# Patient Record
Sex: FEMALE | Race: WHITE | NOT HISPANIC OR LATINO | Employment: FULL TIME | URBAN - NONMETROPOLITAN AREA
[De-identification: names, ages, dates, MRNs, and addresses within clinical notes are randomized per-mention and may not be internally consistent; named-entity substitution may affect disease eponyms.]

---

## 2019-01-02 ENCOUNTER — APPOINTMENT (OUTPATIENT)
Dept: CT IMAGING | Facility: HOSPITAL | Age: 54
End: 2019-01-02

## 2019-01-02 ENCOUNTER — HOSPITAL ENCOUNTER (EMERGENCY)
Facility: HOSPITAL | Age: 54
Discharge: HOME | End: 2019-01-02
Attending: EMERGENCY MEDICINE | Admitting: EMERGENCY MEDICINE

## 2019-01-02 ENCOUNTER — APPOINTMENT (OUTPATIENT)
Dept: GENERAL RADIOLOGY | Facility: HOSPITAL | Age: 54
End: 2019-01-02

## 2019-01-02 VITALS
OXYGEN SATURATION: 100 % | RESPIRATION RATE: 18 BRPM | HEIGHT: 60 IN | TEMPERATURE: 98.9 F | BODY MASS INDEX: 29.27 KG/M2 | WEIGHT: 149.1 LBS | DIASTOLIC BLOOD PRESSURE: 84 MMHG | HEART RATE: 62 BPM | SYSTOLIC BLOOD PRESSURE: 144 MMHG

## 2019-01-02 DIAGNOSIS — M51.26 LUMBAR HERNIATED DISC: Primary | ICD-10-CM

## 2019-01-02 LAB
B-HCG UR QL: NEGATIVE
BACTERIA UR QL AUTO: ABNORMAL /HPF
BILIRUB UR QL STRIP: NEGATIVE
CLARITY UR: CLEAR
COLOR UR: YELLOW
GLUCOSE UR STRIP-MCNC: NEGATIVE MG/DL
HGB UR QL STRIP.AUTO: ABNORMAL
HYALINE CASTS UR QL AUTO: ABNORMAL /LPF
KETONES UR QL STRIP: NEGATIVE
LEUKOCYTE ESTERASE UR QL STRIP.AUTO: NEGATIVE
NITRITE UR QL STRIP: NEGATIVE
PH UR STRIP.AUTO: 5.5 [PH] (ref 5–9)
PROT UR QL STRIP: NEGATIVE
RBC # UR: ABNORMAL /HPF
REF LAB TEST METHOD: ABNORMAL
SP GR UR STRIP: 1.01 (ref 1–1.03)
SQUAMOUS #/AREA URNS HPF: ABNORMAL /HPF
UROBILINOGEN UR QL STRIP: ABNORMAL
WBC UR QL AUTO: ABNORMAL /HPF

## 2019-01-02 PROCEDURE — 72131 CT LUMBAR SPINE W/O DYE: CPT

## 2019-01-02 PROCEDURE — 25010000002 KETOROLAC TROMETHAMINE PER 15 MG: Performed by: PHYSICIAN ASSISTANT

## 2019-01-02 PROCEDURE — 81001 URINALYSIS AUTO W/SCOPE: CPT | Performed by: PHYSICIAN ASSISTANT

## 2019-01-02 PROCEDURE — 72220 X-RAY EXAM SACRUM TAILBONE: CPT

## 2019-01-02 PROCEDURE — 96372 THER/PROPH/DIAG INJ SC/IM: CPT

## 2019-01-02 PROCEDURE — 81025 URINE PREGNANCY TEST: CPT | Performed by: PHYSICIAN ASSISTANT

## 2019-01-02 PROCEDURE — 99283 EMERGENCY DEPT VISIT LOW MDM: CPT

## 2019-01-02 RX ORDER — PREDNISONE 20 MG/1
20 TABLET ORAL 2 TIMES DAILY
Qty: 14 TABLET | Refills: 0 | Status: SHIPPED | OUTPATIENT
Start: 2019-01-02 | End: 2019-01-09

## 2019-01-02 RX ORDER — KETOROLAC TROMETHAMINE 30 MG/ML
30 INJECTION, SOLUTION INTRAMUSCULAR; INTRAVENOUS EVERY 6 HOURS PRN
Status: DISCONTINUED | OUTPATIENT
Start: 2019-01-02 | End: 2019-01-02 | Stop reason: HOSPADM

## 2019-01-02 RX ADMIN — KETOROLAC TROMETHAMINE 30 MG: 30 INJECTION, SOLUTION INTRAMUSCULAR; INTRAVENOUS at 11:26

## 2019-01-03 DIAGNOSIS — M54.5 LOW BACK PAIN, UNSPECIFIED BACK PAIN LATERALITY, UNSPECIFIED CHRONICITY, WITH SCIATICA PRESENCE UNSPECIFIED: Primary | ICD-10-CM

## 2019-01-04 ENCOUNTER — OFFICE VISIT (OUTPATIENT)
Dept: ORTHOPEDIC SURGERY | Facility: CLINIC | Age: 54
End: 2019-01-04

## 2019-01-04 VITALS — BODY MASS INDEX: 29.04 KG/M2 | HEIGHT: 60 IN | WEIGHT: 147.9 LBS

## 2019-01-04 DIAGNOSIS — M51.36 DEGENERATIVE DISC DISEASE, LUMBAR: ICD-10-CM

## 2019-01-04 DIAGNOSIS — M54.42 ACUTE LEFT-SIDED LOW BACK PAIN WITH LEFT-SIDED SCIATICA: Primary | ICD-10-CM

## 2019-01-04 DIAGNOSIS — M54.10 RADICULAR PAIN OF LEFT LOWER EXTREMITY: ICD-10-CM

## 2019-01-04 DIAGNOSIS — M46.1 SI (SACROILIAC) JOINT INFLAMMATION (HCC): ICD-10-CM

## 2019-01-04 PROCEDURE — 99204 OFFICE O/P NEW MOD 45 MIN: CPT | Performed by: NURSE PRACTITIONER

## 2019-01-04 PROCEDURE — 96372 THER/PROPH/DIAG INJ SC/IM: CPT | Performed by: NURSE PRACTITIONER

## 2019-01-04 RX ORDER — IBUPROFEN 200 MG
200 TABLET ORAL EVERY 6 HOURS PRN
COMMUNITY
End: 2019-01-08 | Stop reason: DRUGHIGH

## 2019-01-04 RX ORDER — TRIAMCINOLONE ACETONIDE 40 MG/ML
80 INJECTION, SUSPENSION INTRA-ARTICULAR; INTRAMUSCULAR ONCE
Status: COMPLETED | OUTPATIENT
Start: 2019-01-04 | End: 2019-01-04

## 2019-01-04 RX ORDER — KETOROLAC TROMETHAMINE 30 MG/ML
60 INJECTION, SOLUTION INTRAMUSCULAR; INTRAVENOUS ONCE
Status: COMPLETED | OUTPATIENT
Start: 2019-01-04 | End: 2019-01-04

## 2019-01-04 RX ADMIN — KETOROLAC TROMETHAMINE 60 MG: 30 INJECTION, SOLUTION INTRAMUSCULAR; INTRAVENOUS at 14:41

## 2019-01-04 RX ADMIN — TRIAMCINOLONE ACETONIDE 80 MG: 40 INJECTION, SUSPENSION INTRA-ARTICULAR; INTRAMUSCULAR at 14:39

## 2019-01-04 NOTE — PROGRESS NOTES
Lin Pacheco is a 53 y.o. female   Primary provider:  Provider, No Known       Chief Complaint   Patient presents with   • Lumbar Spine - Pain   • Establish Care       HISTORY OF PRESENT ILLNESS:     53-year-old female patient presents to office for evaluation of acute left-sided low back pain with pain that radiates down her left leg.  Patient had abrupt onset of pain about two weeks ago following a lifting injury when she picked up her dog.  Patient was evaluated in the ED on 1/2/2019 with a CT scan of her lumbar spine.  Patient was given Toradol in the ED and prescribed prednisone.  Patient continues to complain of severe left-sided low back pain and left leg pain.  Pain is described as constant and severe.  Pain is described as stabbing and burning in nature.  No numbness or tingling.  No loss of bowel or bladder control.  Pain is worse with lying down and sitting.  Pain is also present with standing and walking, but slightly improved.  Pain is worse with movement/motion of her back.  Pain improves mildly with oral steroids, rest and Ibuprofen.      Pain   This is a new problem. The current episode started 1 to 4 weeks ago (About 2 weeks ago). The problem occurs constantly. The problem has been unchanged. Associated symptoms include arthralgias and myalgias. Associated symptoms comments: Stabbing, burning. The symptoms are aggravated by bending, standing, twisting, walking and exertion (sitting, lying down). She has tried acetaminophen, NSAIDs, rest and lying down (prednisone, Toradol) for the symptoms. The treatment provided mild relief.        CONCURRENT MEDICAL HISTORY:    History reviewed. No pertinent past medical history.    No Known Allergies      Current Outpatient Medications:   •  predniSONE (DELTASONE) 20 MG tablet, Take 1 tablet by mouth 2 (Two) Times a Day for 7 days., Disp: 14 tablet, Rfl: 0  •  ibuprofen (ADVIL,MOTRIN) 800 MG tablet, Take 1 tablet by mouth Every 8 (Eight) Hours As Needed for  "Moderate Pain  for up to 30 days., Disp: 90 tablet, Rfl: 2    History reviewed. No pertinent surgical history.    Family History   Problem Relation Age of Onset   • Heart disease Other    • Hypertension Other    • Cancer Other    • Diabetes Other         Social History     Socioeconomic History   • Marital status:      Spouse name: Not on file   • Number of children: Not on file   • Years of education: Not on file   • Highest education level: Not on file   Social Needs   • Financial resource strain: Not on file   • Food insecurity - worry: Not on file   • Food insecurity - inability: Not on file   • Transportation needs - medical: Not on file   • Transportation needs - non-medical: Not on file   Occupational History   • Not on file   Tobacco Use   • Smoking status: Current Every Day Smoker     Packs/day: 0.50     Years: 11.00     Pack years: 5.50     Types: Cigarettes   • Smokeless tobacco: Never Used   Substance and Sexual Activity   • Alcohol use: No     Frequency: Never   • Drug use: Not on file   • Sexual activity: Not on file   Other Topics Concern   • Not on file   Social History Narrative   • Not on file        Review of Systems   Constitutional: Positive for activity change.   HENT: Negative.    Eyes: Negative.    Respiratory: Negative.    Cardiovascular: Negative.    Gastrointestinal: Negative.    Endocrine: Negative.    Genitourinary: Negative.    Musculoskeletal: Positive for arthralgias, back pain, gait problem and myalgias.   Skin: Negative.    Allergic/Immunologic: Negative.    Hematological: Negative.    Psychiatric/Behavioral: Positive for sleep disturbance.       PHYSICAL EXAMINATION:       Ht 152.4 cm (60\")   Wt 67.1 kg (147 lb 14.4 oz)   BMI 28.88 kg/m²     Physical Exam   Constitutional: She is oriented to person, place, and time. Vital signs are normal. She appears well-developed and well-nourished. She is active and cooperative. She does not appear ill. No distress.   HENT:   Head: " Normocephalic.   Pulmonary/Chest: Effort normal. No respiratory distress.   Abdominal: Soft. She exhibits no distension.   Musculoskeletal: She exhibits tenderness (Left SI joint). She exhibits no edema or deformity.   Neurological: She is alert and oriented to person, place, and time. No sensory deficit. Coordination normal. GCS eye subscore is 4. GCS verbal subscore is 5. GCS motor subscore is 6.   Skin: Skin is warm, dry and intact. Capillary refill takes less than 2 seconds. No erythema.   Psychiatric: She has a normal mood and affect. Her speech is normal and behavior is normal. Judgment and thought content normal. Cognition and memory are normal.   Vitals reviewed.      GAIT:     []  Normal  [x]  Antalgic    Assistive device: [x]  None  []  Walker     []  Crutches  []  Cane     []  Wheelchair  []  Stretcher    Back Exam     Tenderness   The patient is experiencing tenderness in the sacroiliac.    Range of Motion   Extension: abnormal   Flexion: abnormal   Rotation right: abnormal   Rotation left: abnormal     Muscle Strength   The patient has normal back strength.    Tests   Straight leg raise right: negative  Straight leg raise left: positive at 70 deg    Reflexes   Patellar: normal    Other   Sensation: normal  Gait: antalgic   Erythema: no back redness    Comments:  Pain and limitations with minimal range of motion.  Significant tenderness to palpation in the left SI joint.  Straight leg raise is positive on the left.  Patient is unable to fully sit upright due to pain.  No focal neurological deficits noted.            Xr Spine Lumbar 2 Or 3 View    Result Date: 1/4/2019  Narrative: AP and lateral views of the lumbar spine reveal no evidence of acute fracture or subluxation.  There is mild levoscoliosis noted of the lumbar spine.  There is anterior spurring noted at multiple levels throughout the lumbar spine, more pronounced at L2 and L3.  There is mildly decreased intervertebral disc spacing noted at  multiple levels.  Vertebral body heights are maintained.  No acute bony radiologic abnormalities are noted at this time.  No comparison images are available for review.01/04/19 at 4:46 PM by LUCIANA Devi     Xr Sacrum & Coccyx    Result Date: 1/2/2019  Narrative: PROCEDURE: Sacrum and coccyx TECHNIQUE: AP, lateral and pelvic inlet views of the sacrum and coccyx COMPARISON: None HISTORY: Left buttock pain radiating to the left groin FINDINGS: No radiographic evidence of acute fracture, subluxation or focal osseous lesion. The sacroiliac joints are intact. Rounded calcifications projecting over the pelvis and the appearance of phleboliths.     Impression: CONCLUSION: No radiographic evidence of acute fracture. Electronically signed by:  Martin Parrish MD  1/2/2019 12:44 PM CST Workstation: [id number]    Ct Lumbar Spine Without Contrast    Result Date: 1/2/2019  Narrative: CT lumbar spine without contrast. HISTORY: Back pain with left leg radiculopathy. COMPARISON: CT abdomen pelvis October 15, 2013.   TECHNIQUE: Noncontrast helical scanning with axial and coronal reformations. Soft tissue, lung, and bone windows reviewed. This exam was performed according to our departmental dose-optimization program, which includes automated exposure control, adjustment of the mA and/or kV according to patient size and/or use of iterative reconstruction technique.  CT FINDINGS: Small anterior osteophytes at L2-L3, L1-L2. No fracture or areas of bony destruction. Subtle areas of disc bulging at multiple levels most pronounced at L5-S1. No evidence of a focal disc protrusion. No central canal stenosis. No appreciable foraminal stenosis. Facet arthrosis most pronounced at L4-L5 and L5-S1.     Impression: CONCLUSION: Small anterior osteophytes. Facet arthrosis at L4-L5 and L5-S1. Subtle concentric disc bulging at multiple levels most pronounced at L5-S1. CT lumbar spine without contrast is otherwise unremarkable. No focal disc  protrusion. No central canal or neuroforaminal stenosis. Electronically signed by:  Justyn Valenzuela MD  1/2/2019 11:16 AM CST Workstation: [id number]    ASSESSMENT:    Diagnoses and all orders for this visit:    Acute left-sided low back pain with left-sided sciatica  -     triamcinolone acetonide (KENALOG-40) injection 80 mg; Inject 2 mL into the appropriate muscle as directed by prescriber 1 (One) Time.  -     ketorolac (TORADOL) injection 60 mg; Inject 2 mL into the appropriate muscle as directed by prescriber 1 (One) Time.    Radicular pain of left lower extremity    SI (sacroiliac) joint inflammation (CMS/HCC)    Degenerative disc disease, lumbar    Other orders  -     ibuprofen (ADVIL,MOTRIN) 800 MG tablet; Take 1 tablet by mouth Every 8 (Eight) Hours As Needed for Moderate Pain  for up to 30 days.    PLAN    X-rays of lumbar spine reviewed with no acute findings noted.  CT scan of lumbar spine done in ED on 1/2/2019 reviewed and results discussed with patient.  We discussed the degenerative findings noted on her CT including facet arthrosis and some subtle disc bulging at multiple levels.  Patient is primarily having pain and significant tenderness in the left SI joint. Her symptoms started abruptly when she lifted her dog. While MRI would be helpful for further evaluation of her degenerative disc disease, the patient currently does not have medical insurance MRI was not feasible.  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 She states that her insurance takes effect 3 months after she has started her job.  She reports that she should have medical insurance in the next month or so.  Recommend IM injections of Toradol and Kenalog today for management of pain/inflammation.  Recommend to continue prednisone as previously prescribed in the ED.  Recommend to continue ibuprofen consistently for improved pain control.  Ibuprofen 800 mg is prescribed today.  Patient declines any pain medication.  Recommend  rest and activity modification as tolerated based on her pain with avoidance of lifting, pulling and/or tugging.  He was provided with a work note today and will remain off work for 1 week.  This may need to be extended if she is not improving.  Follow-up in 2 weeks for recheck.  Patient may need an MRI of her lumbar spine once her medical insurance takes effect.    Return in about 2 weeks (around 1/18/2019) for Recheck.      This document has been electronically signed by LUCIANA Devi on January 8, 2019 8:43 AM      LUCIANA Devi

## 2019-01-08 PROBLEM — M51.36 DEGENERATIVE DISC DISEASE, LUMBAR: Status: ACTIVE | Noted: 2019-01-08

## 2019-01-08 PROBLEM — M54.42 ACUTE LEFT-SIDED LOW BACK PAIN WITH LEFT-SIDED SCIATICA: Status: ACTIVE | Noted: 2019-01-08

## 2019-01-08 PROBLEM — M46.1 SI (SACROILIAC) JOINT INFLAMMATION (HCC): Status: ACTIVE | Noted: 2019-01-08

## 2019-01-08 PROBLEM — M54.10 RADICULAR PAIN OF LEFT LOWER EXTREMITY: Status: ACTIVE | Noted: 2019-01-08

## 2019-01-08 RX ORDER — IBUPROFEN 800 MG/1
800 TABLET ORAL EVERY 8 HOURS PRN
Qty: 90 TABLET | Refills: 2 | Status: SHIPPED | OUTPATIENT
Start: 2019-01-08 | End: 2019-02-07

## 2020-02-01 ENCOUNTER — HOSPITAL ENCOUNTER (EMERGENCY)
Facility: HOSPITAL | Age: 55
Discharge: HOME | End: 2020-02-01
Attending: EMERGENCY MEDICINE | Admitting: EMERGENCY MEDICINE

## 2020-02-01 ENCOUNTER — APPOINTMENT (OUTPATIENT)
Dept: GENERAL RADIOLOGY | Facility: HOSPITAL | Age: 55
End: 2020-02-01

## 2020-02-01 VITALS
DIASTOLIC BLOOD PRESSURE: 74 MMHG | HEIGHT: 60 IN | TEMPERATURE: 98.1 F | RESPIRATION RATE: 20 BRPM | SYSTOLIC BLOOD PRESSURE: 144 MMHG | OXYGEN SATURATION: 100 % | HEART RATE: 82 BPM | BODY MASS INDEX: 30.43 KG/M2 | WEIGHT: 155 LBS

## 2020-02-01 DIAGNOSIS — S46.912A STRAIN OF LEFT SHOULDER, INITIAL ENCOUNTER: Primary | ICD-10-CM

## 2020-02-01 PROCEDURE — 25010000002 KETOROLAC TROMETHAMINE PER 15 MG: Performed by: EMERGENCY MEDICINE

## 2020-02-01 PROCEDURE — 99283 EMERGENCY DEPT VISIT LOW MDM: CPT

## 2020-02-01 PROCEDURE — 96372 THER/PROPH/DIAG INJ SC/IM: CPT

## 2020-02-01 PROCEDURE — 73030 X-RAY EXAM OF SHOULDER: CPT

## 2020-02-01 RX ORDER — MELOXICAM 15 MG/1
15 TABLET ORAL DAILY PRN
Qty: 15 TABLET | Refills: 0 | Status: SHIPPED | OUTPATIENT
Start: 2020-02-01 | End: 2020-03-10

## 2020-02-01 RX ORDER — KETOROLAC TROMETHAMINE 30 MG/ML
60 INJECTION, SOLUTION INTRAMUSCULAR; INTRAVENOUS ONCE
Status: COMPLETED | OUTPATIENT
Start: 2020-02-01 | End: 2020-02-01

## 2020-02-01 RX ORDER — METHOCARBAMOL 500 MG/1
1000 TABLET, FILM COATED ORAL 4 TIMES DAILY PRN
Qty: 20 TABLET | Refills: 0 | Status: SHIPPED | OUTPATIENT
Start: 2020-02-01 | End: 2020-03-10

## 2020-02-01 RX ADMIN — KETOROLAC TROMETHAMINE 60 MG: 60 INJECTION, SOLUTION INTRAMUSCULAR at 21:59

## 2020-02-02 NOTE — ED NOTES
Phoned xray related to when they will come, be here in a minute      Cyndi Lopez, RN  02/01/20 [id number]
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
## 2020-03-10 ENCOUNTER — LAB (OUTPATIENT)
Dept: LAB | Facility: HOSPITAL | Age: 55
End: 2020-03-10

## 2020-03-10 ENCOUNTER — TELEPHONE (OUTPATIENT)
Dept: FAMILY MEDICINE CLINIC | Facility: CLINIC | Age: 55
End: 2020-03-10

## 2020-03-10 ENCOUNTER — OFFICE VISIT (OUTPATIENT)
Dept: FAMILY MEDICINE CLINIC | Facility: CLINIC | Age: 55
End: 2020-03-10

## 2020-03-10 VITALS
BODY MASS INDEX: 29.45 KG/M2 | SYSTOLIC BLOOD PRESSURE: 108 MMHG | DIASTOLIC BLOOD PRESSURE: 70 MMHG | WEIGHT: 150 LBS | OXYGEN SATURATION: 98 % | HEART RATE: 87 BPM | HEIGHT: 60 IN

## 2020-03-10 DIAGNOSIS — Z13.220 ENCOUNTER FOR LIPID SCREENING FOR CARDIOVASCULAR DISEASE: ICD-10-CM

## 2020-03-10 DIAGNOSIS — Z76.89 ENCOUNTER TO ESTABLISH CARE: ICD-10-CM

## 2020-03-10 DIAGNOSIS — Z13.6 ENCOUNTER FOR LIPID SCREENING FOR CARDIOVASCULAR DISEASE: ICD-10-CM

## 2020-03-10 DIAGNOSIS — K27.9 PEPTIC ULCER: ICD-10-CM

## 2020-03-10 DIAGNOSIS — Z11.59 NEED FOR HEPATITIS C SCREENING TEST: ICD-10-CM

## 2020-03-10 DIAGNOSIS — R10.13 EPIGASTRIC PAIN: Primary | ICD-10-CM

## 2020-03-10 LAB
ALBUMIN SERPL-MCNC: 4.4 G/DL (ref 3.5–5.2)
ALBUMIN/GLOB SERPL: 1.3 G/DL
ALP SERPL-CCNC: 129 U/L (ref 39–117)
ALT SERPL W P-5'-P-CCNC: 16 U/L (ref 1–33)
ANION GAP SERPL CALCULATED.3IONS-SCNC: 14 MMOL/L (ref 5–15)
AST SERPL-CCNC: 22 U/L (ref 1–32)
BASOPHILS # BLD AUTO: 0.04 10*3/MM3 (ref 0–0.2)
BASOPHILS NFR BLD AUTO: 0.4 % (ref 0–1.5)
BILIRUB SERPL-MCNC: 0.2 MG/DL (ref 0.2–1.2)
BUN BLD-MCNC: 8 MG/DL (ref 6–20)
BUN/CREAT SERPL: 9.4 (ref 7–25)
CALCIUM SPEC-SCNC: 9.9 MG/DL (ref 8.6–10.5)
CHLORIDE SERPL-SCNC: 104 MMOL/L (ref 98–107)
CHOLEST SERPL-MCNC: 193 MG/DL (ref 0–200)
CO2 SERPL-SCNC: 25 MMOL/L (ref 22–29)
CREAT BLD-MCNC: 0.85 MG/DL (ref 0.57–1)
DEPRECATED RDW RBC AUTO: 42.5 FL (ref 37–54)
EOSINOPHIL # BLD AUTO: 0.22 10*3/MM3 (ref 0–0.4)
EOSINOPHIL NFR BLD AUTO: 2.2 % (ref 0.3–6.2)
ERYTHROCYTE [DISTWIDTH] IN BLOOD BY AUTOMATED COUNT: 13.4 % (ref 12.3–15.4)
GFR SERPL CREATININE-BSD FRML MDRD: 70 ML/MIN/1.73
GLOBULIN UR ELPH-MCNC: 3.3 GM/DL
GLUCOSE BLD-MCNC: 101 MG/DL (ref 65–99)
HCT VFR BLD AUTO: 39.9 % (ref 34–46.6)
HCV AB SER DONR QL: NORMAL
HDLC SERPL-MCNC: 43 MG/DL (ref 40–60)
HGB BLD-MCNC: 13 G/DL (ref 12–15.9)
IMM GRANULOCYTES # BLD AUTO: 0.02 10*3/MM3 (ref 0–0.05)
IMM GRANULOCYTES NFR BLD AUTO: 0.2 % (ref 0–0.5)
LDLC SERPL CALC-MCNC: 103 MG/DL (ref 0–100)
LDLC/HDLC SERPL: 2.39 {RATIO}
LIPASE SERPL-CCNC: 21 U/L (ref 13–60)
LYMPHOCYTES # BLD AUTO: 3.64 10*3/MM3 (ref 0.7–3.1)
LYMPHOCYTES NFR BLD AUTO: 36.5 % (ref 19.6–45.3)
MCH RBC QN AUTO: 28.3 PG (ref 26.6–33)
MCHC RBC AUTO-ENTMCNC: 32.6 G/DL (ref 31.5–35.7)
MCV RBC AUTO: 86.9 FL (ref 79–97)
MONOCYTES # BLD AUTO: 0.45 10*3/MM3 (ref 0.1–0.9)
MONOCYTES NFR BLD AUTO: 4.5 % (ref 5–12)
NEUTROPHILS # BLD AUTO: 5.6 10*3/MM3 (ref 1.7–7)
NEUTROPHILS NFR BLD AUTO: 56.2 % (ref 42.7–76)
NRBC BLD AUTO-RTO: 0 /100 WBC (ref 0–0.2)
PLATELET # BLD AUTO: 289 10*3/MM3 (ref 140–450)
PMV BLD AUTO: 9.8 FL (ref 6–12)
POTASSIUM BLD-SCNC: 4.3 MMOL/L (ref 3.5–5.2)
PROT SERPL-MCNC: 7.7 G/DL (ref 6–8.5)
RBC # BLD AUTO: 4.59 10*6/MM3 (ref 3.77–5.28)
SODIUM BLD-SCNC: 143 MMOL/L (ref 136–145)
TRIGL SERPL-MCNC: 236 MG/DL (ref 0–150)
VLDLC SERPL-MCNC: 47.2 MG/DL
WBC NRBC COR # BLD: 9.97 10*3/MM3 (ref 3.4–10.8)

## 2020-03-10 PROCEDURE — 86803 HEPATITIS C AB TEST: CPT

## 2020-03-10 PROCEDURE — 99203 OFFICE O/P NEW LOW 30 MIN: CPT | Performed by: FAMILY MEDICINE

## 2020-03-10 PROCEDURE — 85025 COMPLETE CBC W/AUTO DIFF WBC: CPT

## 2020-03-10 PROCEDURE — 83690 ASSAY OF LIPASE: CPT

## 2020-03-10 PROCEDURE — 36415 COLL VENOUS BLD VENIPUNCTURE: CPT

## 2020-03-10 PROCEDURE — 80053 COMPREHEN METABOLIC PANEL: CPT

## 2020-03-10 PROCEDURE — 80061 LIPID PANEL: CPT

## 2020-03-10 NOTE — TELEPHONE ENCOUNTER
Per Dr. Self, Ms. Pacheco has been called with results and recommendations.  Continue her current medications and follow-up as planned or sooner if any problems.

## 2020-03-10 NOTE — TELEPHONE ENCOUNTER
Please call patient with results.  Lipid panel used to calculate patient's 10-year risk of ASCVD (Heart attack/stroke).  Risk is 4%, which is low.  Will discuss steps toward smoking cessation at next visit, as this is one of the best ways to decrease her risk.  No statin indicated at this time.  Otherwise blood work from this morning was normal.  No anemia.  Patient should continue Nexium.  Should be seen in the emergency room with any GI bleeding as discussed at earlier appointment.  Thanks, LIZ Self

## 2020-03-10 NOTE — PROGRESS NOTES
Subjective   Chief Complaint   Patient presents with   • Establish Care   • Abdominal Pain     blood in stool x1day     Lin Pacheco is a 54 y.o. year old presenting to establish care as new patient.      Additional Concerns:    Epigastric pain - Patient notes that she has epigastric pain that started about a week ago.  Pain when first started was constantly dull with intermittent sharpness.  She has bloating in her stomach.  Pain is worse when she is sitting, and relieved by lying down.  Pain is also significantly worse after eating meals.  Pain not increased with certain foods. Pain sometimes radiates to her back.  At its worst, pain is 7 out of 10.  At its best, pain is 0 out of 10.  She has never had a pain like this before.  Patient started taking Nexium on her own.  She is taking 40 mg daily.  With the Nexium, as long as she is not eating she does not have pain.  Patient reports noting dark black blood in her stool for 2 bowel movements last week, but none since this time.        Past Medical History:   Diagnosis Date   • Depression        Past Surgical History:   Procedure Laterality Date   •  SECTION     • CHOLECYSTECTOMY         Medications:  Current Outpatient Medications on File Prior to Visit   Medication Sig Dispense Refill   • esomeprazole (nexIUM) 20 MG capsule Take 20 mg by mouth 2 (Two) Times a Day.     • [DISCONTINUED] meloxicam (MOBIC) 15 MG tablet Take 1 tablet by mouth Daily As Needed for Mild Pain . 15 tablet 0   • [DISCONTINUED] methocarbamol (ROBAXIN) 500 MG tablet Take 2 tablets by mouth 4 (Four) Times a Day As Needed for Muscle Spasms. 20 tablet 0     No current facility-administered medications on file prior to visit.        No Known Allergies    Family History   Problem Relation Age of Onset   • Heart disease Other    • Hypertension Other    • Cancer Other    • Diabetes Mother        Social History     Socioeconomic History   • Marital status:      Spouse name: Not on  "file   • Number of children: Not on file   • Years of education: Not on file   • Highest education level: Not on file   Tobacco Use   • Smoking status: Current Every Day Smoker     Packs/day: 1.00     Years: 5.00     Pack years: 5.00     Types: Cigarettes   • Smokeless tobacco: Never Used   Substance and Sexual Activity   • Alcohol use: No     Frequency: Never     LMP: Post menopausal, menopause at age 51    Health Maintenance   Topic Date Due   • MAMMOGRAM  1965   • TDAP/TD VACCINES (1 - Tdap) 08/20/1976   • PNEUMOCOCCAL VACCINE (19-64 MEDIUM RISK) (1 of 1 - PPSV23) 08/20/1984   • ZOSTER VACCINE (1 of 2) 08/20/2015   • PAP SMEAR  01/03/2019   • COLONOSCOPY  01/03/2019   • INFLUENZA VACCINE  08/01/2019       Problem list was reviewed and updated as appropriate.      Review of Systems   Constitutional: Negative for appetite change and unexpected weight change.   HENT: Negative for voice change.    Eyes: Negative for visual disturbance.   Respiratory: Negative for chest tightness and shortness of breath.    Cardiovascular: Negative for chest pain and leg swelling.   Gastrointestinal: Positive for abdominal pain, blood in stool (dark) and nausea. Negative for constipation and diarrhea.   Endocrine: Positive for heat intolerance. Negative for cold intolerance.   Genitourinary: Negative for difficulty urinating.   Musculoskeletal: Negative for back pain and myalgias.   Skin: Negative for rash.   Neurological: Negative for numbness and headaches.   Psychiatric/Behavioral: Negative for dysphoric mood and sleep disturbance.         Objective     /70   Pulse 87   Ht 152.4 cm (60\")   Wt 68 kg (150 lb)   SpO2 98%   BMI 29.29 kg/m²   Physical Exam   Constitutional: She is oriented to person, place, and time. She appears well-developed and well-nourished. No distress.   HENT:   Head: Normocephalic and atraumatic.   Nose: Nose normal.   Mouth/Throat: Oropharynx is clear and moist.   Eyes: Pupils are equal, round, " and reactive to light. Conjunctivae and EOM are normal.   Neck: Normal range of motion. Neck supple. No thyromegaly present.   Cardiovascular: Normal rate, regular rhythm and normal heart sounds.   No murmur heard.  Pulmonary/Chest: Effort normal and breath sounds normal. No respiratory distress. She has no wheezes. She has no rales.   Abdominal: Soft. Bowel sounds are normal. She exhibits no distension. There is no hepatosplenomegaly. There is tenderness in the epigastric area. There is no rigidity, no rebound, no guarding, no CVA tenderness and negative Pacheco's sign.   Musculoskeletal: Normal range of motion. She exhibits no edema or tenderness.   Lymphadenopathy:     She has no cervical adenopathy.   Neurological: She is alert and oriented to person, place, and time.   Skin: Skin is warm and dry. No rash noted.   Psychiatric: She has a normal mood and affect.   Vitals reviewed.      Lab Review   No data reviewed    Imaging  No data reviewed         Assessment/Plan   Lin was seen today for establish care and abdominal pain.    Diagnoses and all orders for this visit:    Epigastric pain  Epigastric pain for the last week  Concerning for peptic ulcer disease  Patient should continue Nexium 40 mg daily  No NSAIDs  Will check labs, including H. pylori antigen in the stool  May result in false results given that patient is already on PPI  May consider CT scan for further evaluation  Patient will be referred to gastroenterology  History of melena x2 bowel movements, this has stopped  Advised patient be seen in the emergency room for more urgent care with any GI bleeding, including hematochezia, melena or hematemesis  She voiced understanding  -     Comprehensive Metabolic Panel; Future  -     CBC & Differential; Future  -     Lipase; Future  -     H. Pylori Antigen, Stool - Stool, Per Rectum; Future  -     Ambulatory Referral to Gastroenterology    Encounter to establish care    Encounter for lipid screening for  cardiovascular disease  -     Lipid Panel; Future    Need for hepatitis C screening test  -     Hepatitis C antibody; Future               This document has been electronically signed by Sunshine Self MD on March 10, 2020 09:52

## 2020-03-11 ENCOUNTER — LAB (OUTPATIENT)
Dept: LAB | Facility: HOSPITAL | Age: 55
End: 2020-03-11

## 2020-03-11 DIAGNOSIS — R10.13 EPIGASTRIC PAIN: ICD-10-CM

## 2020-03-11 PROCEDURE — 87338 HPYLORI STOOL AG IA: CPT

## 2020-03-13 LAB — H PYLORI AG STL QL IA: NEGATIVE

## 2020-03-16 ENCOUNTER — TELEPHONE (OUTPATIENT)
Dept: FAMILY MEDICINE CLINIC | Facility: CLINIC | Age: 55
End: 2020-03-16

## 2020-03-16 NOTE — TELEPHONE ENCOUNTER
Please call and let patient know that stool sample was negative for the bacteria that sometimes causes ulcers (H. Pylori).  Remind her that this may not be completely accurate result, given that she has been on PPIs (Nexium).  She should continue taking Nexium as discussed at last visit.  She should follow-up with gastroenterology.  ThanksLIZ   no

## 2020-03-23 ENCOUNTER — TELEPHONE (OUTPATIENT)
Dept: FAMILY MEDICINE CLINIC | Facility: CLINIC | Age: 55
End: 2020-03-23

## 2020-03-23 NOTE — TELEPHONE ENCOUNTER
Lin left a message asking that you please call her back because she was sent home from work with a low grade temp.

## 2020-03-23 NOTE — TELEPHONE ENCOUNTER
Spoke with patient.  She notes that she has been running a low grade fever for the last two days, a little over one hundred.  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  Patient notes that she is not having any other respiratory symptoms.  She has not been exposed that she knows of.  She does not have any chronic health conditions that make her immunocompromised.  Patient says that she needs paperwork completed.  Will send work note for 2 weeks.  Discussed self quarantine measures and advised when patient should be evaluated.  She voiced understanding.  Kristi Self

## 2020-03-24 NOTE — TELEPHONE ENCOUNTER
Called to ask patient if there was a fax number I could fax excuse to. She stated that she didn't think she needed one now. Let her know what the excuse said and she said her employer would definitely not be happy with her having that much time off. She stated that she was gonna call them and see what they had to say.

## 2020-05-04 ENCOUNTER — TELEPHONE (OUTPATIENT)
Dept: FAMILY MEDICINE CLINIC | Facility: CLINIC | Age: 55
End: 2020-05-04

## 2020-05-04 NOTE — TELEPHONE ENCOUNTER
Received message that patient missed appointment with GI physician, Dr. Kumari.  Please try and contact patient to ask that she call this office to reschedule.  ThanksLIZ

## 2020-05-08 NOTE — TELEPHONE ENCOUNTER
Please send a letter with reminder for her appointment with us 6/2/2020 and asking that she contact the GI physician to reschedule.  ThanksLIZ

## 2020-06-10 ENCOUNTER — OFFICE VISIT (OUTPATIENT)
Dept: FAMILY MEDICINE CLINIC | Facility: CLINIC | Age: 55
End: 2020-06-10

## 2020-06-10 VITALS
BODY MASS INDEX: 29.76 KG/M2 | DIASTOLIC BLOOD PRESSURE: 68 MMHG | WEIGHT: 151.6 LBS | OXYGEN SATURATION: 99 % | HEIGHT: 60 IN | SYSTOLIC BLOOD PRESSURE: 124 MMHG | HEART RATE: 77 BPM

## 2020-06-10 DIAGNOSIS — F33.1 MODERATE EPISODE OF RECURRENT MAJOR DEPRESSIVE DISORDER (HCC): Primary | ICD-10-CM

## 2020-06-10 PROCEDURE — 99213 OFFICE O/P EST LOW 20 MIN: CPT | Performed by: FAMILY MEDICINE

## 2020-06-10 RX ORDER — FLUOXETINE HYDROCHLORIDE 20 MG/1
20 CAPSULE ORAL DAILY
Qty: 30 CAPSULE | Refills: 0 | Status: SHIPPED | OUTPATIENT
Start: 2020-06-10 | End: 2020-07-10

## 2020-06-16 NOTE — PROGRESS NOTES
"Follow Up Office Visit          Lin Pacheco is a 54 y.o. female that presents today for   Chief Complaint   Patient presents with   • Depression       HPI    Patient presents today with increased depression symptoms.  Patient has been through this before.  She notes that she has been having more crying spells recently and feeling more down.  Not enjoying things like she used to.  Patient previously treated with Prozac during one of these depression episodes.  She is not sure of the exact dose.  Patient in the past had suicidal thoughts and plans.  She reports that she is not at that point now, but wants to make sure that she does not get back to that place.  She would like to be restarted on her Prozac.  Patient also is requesting a couple weeks off from work.  She notes that her depression is interfering with her job.  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    The following portions of the patient's history were reviewed and updated as appropriate: allergies, current medications, past medical history and problem list.    Review of Systems   Constitutional: Negative for chills and fever.   HENT: Negative for congestion and sinus pain.    Respiratory: Negative for cough and shortness of breath.    Cardiovascular: Negative for chest pain and leg swelling.   Gastrointestinal: Negative for abdominal pain, diarrhea, nausea and vomiting.   Musculoskeletal: Negative for back pain, gait problem and joint swelling.   Skin: Negative for rash.   Neurological: Negative for weakness and headaches.   Psychiatric/Behavioral: Positive for dysphoric mood. Negative for sleep disturbance.           Vitals:    06/10/20 1050   BP: 124/68   Pulse: 77   SpO2: 99%   Weight: 68.8 kg (151 lb 9.6 oz)   Height: 152.4 cm (60\")     Body mass index is 29.61 kg/m².    Physical Exam   Constitutional: She is oriented to person, place, and time. She appears well-developed and " well-nourished. No distress.   HENT:   Head: Normocephalic and atraumatic.   Mouth/Throat: Oropharynx is clear and moist.   Eyes: EOM are normal.   Neck: Neck supple.   Cardiovascular: Normal rate, regular rhythm and normal heart sounds.   No murmur heard.  Pulmonary/Chest: Effort normal and breath sounds normal. No respiratory distress. She has no wheezes. She has no rales.   Abdominal: Soft. There is no tenderness.   Musculoskeletal: She exhibits no edema.   Neurological: She is alert and oriented to person, place, and time.   Skin: Skin is warm and dry. No rash noted.   Psychiatric: She has a normal mood and affect.   Vitals reviewed.      Assessment/Plan   Lin was seen today for depression.    Diagnoses and all orders for this visit:    Moderate episode of recurrent major depressive disorder (CMS/HCC)  -     FLUoxetine (PROzac) 20 MG capsule; Take 1 capsule by mouth Daily for 30 days.    We will plan to restart patient on Prozac.  She has done well on this antidepressant in the past.  We will start with 20 mg daily.  This dose may need to be increased over the next few weeks.  Patient denies any suicidal ideation or plan at this time, and was encouraged to be seen immediately should any of these thoughts occur again.  We will follow-up in 4 weeks to check on patient's depression symptoms.  Work note provided for the next 2 weeks.  Patient should be able to safely return at this time.          This document has been electronically signed by Sunshine Self MD on June 16, 2020 07:26

## 2020-06-17 ENCOUNTER — TELEPHONE (OUTPATIENT)
Dept: FAMILY MEDICINE CLINIC | Facility: CLINIC | Age: 55
End: 2020-06-17

## 2020-06-17 DIAGNOSIS — K21.9 GASTROESOPHAGEAL REFLUX DISEASE, ESOPHAGITIS PRESENCE NOT SPECIFIED: ICD-10-CM

## 2020-06-17 DIAGNOSIS — R10.13 EPIGASTRIC PAIN: Primary | ICD-10-CM

## 2020-06-17 RX ORDER — SUCRALFATE ORAL 1 G/10ML
1 SUSPENSION ORAL
Qty: 420 ML | Refills: 2 | Status: SHIPPED | OUTPATIENT
Start: 2020-06-17 | End: 2022-05-25

## 2020-06-17 NOTE — TELEPHONE ENCOUNTER
PT called, states she was referred to Dr. Kumari (Gastroenterology) by Dr. Self for evaluation for a possible ulcer. She received a notification that Dr. Kumari is not in network with her insurance carrier, so she'd like to move forward with a referral to Dr. Howard in an attempt to be seen ASAP. Reports experiencing severe abdominal pain, an 8 on a scale of 1-10. Spoke with CAITIE Carrasco in clinic, advised to send high priority referral message.     Please call Lin back with an update: [phone].

## 2020-06-17 NOTE — TELEPHONE ENCOUNTER
Patient having epigastric pain again with reflux.  Nexium does not seem to be helping much.  Having some vomiting as well.    No blood and no coffee ground appearance.  Patient was referred to GI, but this provider was not in her insurance network.  She is requesting to see an alternative provider.  Will refer to GI physician.  Patient will be given prescription for Carafate to use in the meantime as well.  She should continue nexium 20 mg BID at this time.  Patient will likely need endoscopy for further evaluation of epigastric pain.  Patient advised to be seen in clinic/urgent care/ED for worsening pain, new symptoms, bilious/black/bloody vomiting and/or any systemic signs of infection like fever.  She voiced understanding.  Kristi Snyder

## 2020-06-20 ENCOUNTER — APPOINTMENT (OUTPATIENT)
Dept: CT IMAGING | Facility: HOSPITAL | Age: 55
End: 2020-06-20

## 2020-06-20 ENCOUNTER — HOSPITAL ENCOUNTER (EMERGENCY)
Facility: HOSPITAL | Age: 55
Discharge: HOME | End: 2020-06-20
Attending: EMERGENCY MEDICINE | Admitting: EMERGENCY MEDICINE

## 2020-06-20 VITALS
HEART RATE: 64 BPM | RESPIRATION RATE: 18 BRPM | OXYGEN SATURATION: 98 % | SYSTOLIC BLOOD PRESSURE: 144 MMHG | BODY MASS INDEX: 29.45 KG/M2 | DIASTOLIC BLOOD PRESSURE: 67 MMHG | TEMPERATURE: 98 F | WEIGHT: 150 LBS | HEIGHT: 60 IN

## 2020-06-20 DIAGNOSIS — K21.9 GERD WITHOUT ESOPHAGITIS: Primary | ICD-10-CM

## 2020-06-20 LAB
ALBUMIN SERPL-MCNC: 4.2 G/DL (ref 3.5–5.2)
ALBUMIN/GLOB SERPL: 1.6 G/DL
ALP SERPL-CCNC: 119 U/L (ref 39–117)
ALT SERPL W P-5'-P-CCNC: 12 U/L (ref 1–33)
ANION GAP SERPL CALCULATED.3IONS-SCNC: 8 MMOL/L (ref 5–15)
AST SERPL-CCNC: 19 U/L (ref 1–32)
BACTERIA UR QL AUTO: ABNORMAL /HPF
BILIRUB SERPL-MCNC: 0.2 MG/DL (ref 0.2–1.2)
BILIRUB UR QL STRIP: NEGATIVE
BUN BLD-MCNC: 8 MG/DL (ref 6–20)
BUN/CREAT SERPL: 10.5 (ref 7–25)
CALCIUM SPEC-SCNC: 8.9 MG/DL (ref 8.6–10.5)
CHLORIDE SERPL-SCNC: 106 MMOL/L (ref 98–107)
CLARITY UR: CLEAR
CO2 SERPL-SCNC: 27 MMOL/L (ref 22–29)
COLOR UR: YELLOW
CREAT BLD-MCNC: 0.76 MG/DL (ref 0.57–1)
DEPRECATED RDW RBC AUTO: 41.1 FL (ref 37–54)
EOSINOPHIL # BLD MANUAL: 0.45 10*3/MM3 (ref 0–0.4)
EOSINOPHIL NFR BLD MANUAL: 5 % (ref 0.3–6.2)
ERYTHROCYTE [DISTWIDTH] IN BLOOD BY AUTOMATED COUNT: 13.2 % (ref 12.3–15.4)
GFR SERPL CREATININE-BSD FRML MDRD: 79 ML/MIN/1.73
GLOBULIN UR ELPH-MCNC: 2.7 GM/DL
GLUCOSE BLD-MCNC: 124 MG/DL (ref 65–99)
GLUCOSE UR STRIP-MCNC: NEGATIVE MG/DL
HCT VFR BLD AUTO: 36.9 % (ref 34–46.6)
HGB BLD-MCNC: 11.9 G/DL (ref 12–15.9)
HGB UR QL STRIP.AUTO: ABNORMAL
HOLD SPECIMEN: NORMAL
HOLD SPECIMEN: NORMAL
HYALINE CASTS UR QL AUTO: ABNORMAL /LPF
KETONES UR QL STRIP: NEGATIVE
LEUKOCYTE ESTERASE UR QL STRIP.AUTO: NEGATIVE
LIPASE SERPL-CCNC: 24 U/L (ref 13–60)
LYMPHOCYTES # BLD MANUAL: 4.25 10*3/MM3 (ref 0.7–3.1)
LYMPHOCYTES NFR BLD MANUAL: 1 % (ref 5–12)
LYMPHOCYTES NFR BLD MANUAL: 47 % (ref 19.6–45.3)
MCH RBC QN AUTO: 27.9 PG (ref 26.6–33)
MCHC RBC AUTO-ENTMCNC: 32.2 G/DL (ref 31.5–35.7)
MCV RBC AUTO: 86.6 FL (ref 79–97)
MONOCYTES # BLD AUTO: 0.09 10*3/MM3 (ref 0.1–0.9)
NEUTROPHILS # BLD AUTO: 4.25 10*3/MM3 (ref 1.7–7)
NEUTROPHILS NFR BLD MANUAL: 46 % (ref 42.7–76)
NEUTS BAND NFR BLD MANUAL: 1 % (ref 0–5)
NITRITE UR QL STRIP: NEGATIVE
PH UR STRIP.AUTO: 6.5 [PH] (ref 5–9)
PLATELET # BLD AUTO: 244 10*3/MM3 (ref 140–450)
PMV BLD AUTO: 9.3 FL (ref 6–12)
POTASSIUM BLD-SCNC: 3.6 MMOL/L (ref 3.5–5.2)
PROT SERPL-MCNC: 6.9 G/DL (ref 6–8.5)
PROT UR QL STRIP: NEGATIVE
RBC # BLD AUTO: 4.26 10*6/MM3 (ref 3.77–5.28)
RBC # UR: ABNORMAL /HPF
RBC MORPH BLD: NORMAL
REF LAB TEST METHOD: ABNORMAL
SMALL PLATELETS BLD QL SMEAR: ADEQUATE
SODIUM BLD-SCNC: 141 MMOL/L (ref 136–145)
SP GR UR STRIP: 1.01 (ref 1–1.03)
SQUAMOUS #/AREA URNS HPF: ABNORMAL /HPF
UROBILINOGEN UR QL STRIP: ABNORMAL
WBC MORPH BLD: NORMAL
WBC NRBC COR # BLD: 9.04 10*3/MM3 (ref 3.4–10.8)
WBC UR QL AUTO: ABNORMAL /HPF
WHOLE BLOOD HOLD SPECIMEN: NORMAL
WHOLE BLOOD HOLD SPECIMEN: NORMAL

## 2020-06-20 PROCEDURE — 81001 URINALYSIS AUTO W/SCOPE: CPT | Performed by: EMERGENCY MEDICINE

## 2020-06-20 PROCEDURE — 25010000002 IOPAMIDOL 61 % SOLUTION: Performed by: EMERGENCY MEDICINE

## 2020-06-20 PROCEDURE — 85007 BL SMEAR W/DIFF WBC COUNT: CPT | Performed by: EMERGENCY MEDICINE

## 2020-06-20 PROCEDURE — 99283 EMERGENCY DEPT VISIT LOW MDM: CPT

## 2020-06-20 PROCEDURE — 0 DIATRIZOATE MEGLUMINE & SODIUM PER 1 ML: Performed by: EMERGENCY MEDICINE

## 2020-06-20 PROCEDURE — 74177 CT ABD & PELVIS W/CONTRAST: CPT

## 2020-06-20 PROCEDURE — 80053 COMPREHEN METABOLIC PANEL: CPT | Performed by: EMERGENCY MEDICINE

## 2020-06-20 PROCEDURE — 83690 ASSAY OF LIPASE: CPT | Performed by: NURSE PRACTITIONER

## 2020-06-20 PROCEDURE — 85025 COMPLETE CBC W/AUTO DIFF WBC: CPT | Performed by: EMERGENCY MEDICINE

## 2020-06-20 RX ORDER — ALUMINA, MAGNESIA, AND SIMETHICONE 2400; 2400; 240 MG/30ML; MG/30ML; MG/30ML
15 SUSPENSION ORAL ONCE
Status: COMPLETED | OUTPATIENT
Start: 2020-06-20 | End: 2020-06-20

## 2020-06-20 RX ORDER — LIDOCAINE HYDROCHLORIDE 20 MG/ML
15 SOLUTION OROPHARYNGEAL ONCE
Status: COMPLETED | OUTPATIENT
Start: 2020-06-20 | End: 2020-06-20

## 2020-06-20 RX ORDER — SODIUM CHLORIDE 0.9 % (FLUSH) 0.9 %
10 SYRINGE (ML) INJECTION AS NEEDED
Status: DISCONTINUED | OUTPATIENT
Start: 2020-06-20 | End: 2020-06-20 | Stop reason: HOSPADM

## 2020-06-20 RX ADMIN — LIDOCAINE HYDROCHLORIDE 15 ML: 20 SOLUTION ORAL; TOPICAL at 12:27

## 2020-06-20 RX ADMIN — IOPAMIDOL 80 ML: 612 INJECTION, SOLUTION INTRAVENOUS at 14:21

## 2020-06-20 RX ADMIN — DIATRIZOATE MEGLUMINE AND DIATRIZOATE SODIUM 30 ML: 660; 100 LIQUID ORAL; RECTAL at 14:25

## 2020-06-20 RX ADMIN — ALUMINUM HYDROXIDE, MAGNESIUM HYDROXIDE, AND DIMETHICONE 15 ML: 400; 400; 40 SUSPENSION ORAL at 12:27

## 2020-06-20 NOTE — ED PROVIDER NOTES
Subjective   54-year-old female in the emergency department complaining of a 4-day onset of abdominal pain.  Patient is been seen by her primary care and was started on PPI therapy.  Patient reports she had a referral to GI but evidently he has not her network.  Patient reports the pain got significantly worse this morning and she came to the emergency department.  Pain is described as epigastric, burning with regurgitation.  No known history of hernia that she is aware of      History provided by:  Patient   used: No        Review of Systems   Constitutional: Negative for chills and fatigue.   Respiratory: Negative for shortness of breath.    Cardiovascular: Negative for chest pain and palpitations.   Gastrointestinal: Positive for abdominal pain. Negative for diarrhea, nausea and vomiting.   Genitourinary: Negative for flank pain.   Musculoskeletal: Negative for back pain.   Skin: Negative for wound.   Allergic/Immunologic: Negative for immunocompromised state.   Neurological: Negative for weakness.   Hematological: Negative for adenopathy.   Psychiatric/Behavioral: Negative for confusion.   All other systems reviewed and are negative.      Past Medical History:   Diagnosis Date   • Anxiety    • Depression        No Known Allergies    Past Surgical History:   Procedure Laterality Date   •  SECTION     • CHOLECYSTECTOMY         Family History   Problem Relation Age of Onset   • Heart disease Other    • Hypertension Other    • Cancer Other    • Diabetes Mother        Social History     Socioeconomic History   • Marital status:      Spouse name: Not on file   • Number of children: Not on file   • Years of education: Not on file   • Highest education level: Not on file   Tobacco Use   • Smoking status: Current Every Day Smoker     Packs/day: 1.00     Years: 5.00     Pack years: 5.00     Types: Cigarettes   • Smokeless tobacco: Never Used   Substance and Sexual Activity   • Alcohol  use: No     Frequency: Never           Objective   Physical Exam   Constitutional: She is oriented to person, place, and time. Vital signs are normal. She appears well-developed and well-nourished.   HENT:   Head: Normocephalic.   Nose: Nose normal.   Eyes: Pupils are equal, round, and reactive to light. Conjunctivae are normal.   Neck: Normal range of motion.   Cardiovascular: Normal rate, regular rhythm and normal heart sounds.   Pulmonary/Chest: Effort normal and breath sounds normal.   Abdominal: Soft. There is tenderness in the epigastric area.   Musculoskeletal: Normal range of motion.   Neurological: She is alert and oriented to person, place, and time. GCS eye subscore is 4. GCS verbal subscore is 5. GCS motor subscore is 6.   Skin: Skin is warm and dry.   Psychiatric: She has a normal mood and affect.   Nursing note and vitals reviewed.      Procedures           ED Course            CT Abdomen Pelvis With Contrast   Final Result   1. The pancreas appears somewhat heterogeneous and margins are   indistinct with mild peripancreatic stranding. Clinical   correlation for pancreatitis is recommended.   2. Status post cholecystectomy   3. Mild hepatomegaly without focal lesion identified.   4. Hiatal hernia   5. Small umbilical hernia containing only fat   6. Please see findings section above for further details      Electronically signed by:  Shannon Arroyo MD  6/20/2020 2:53 PM CDT   Workstation: [id number]        Results for orders placed or performed during the hospital encounter of 06/20/20   Urinalysis With Microscopic If Indicated (No Culture) - Urine, Clean Catch   Result Value Ref Range    Color, UA Yellow Yellow, Straw, Dark Yellow, Melina    Appearance, UA Clear Clear    pH, UA 6.5 5.0 - 9.0    Specific Gravity, UA 1.006 1.003 - 1.030    Glucose, UA Negative Negative    Ketones, UA Negative Negative    Bilirubin, UA Negative Negative    Blood, UA Small (1+) (A) Negative    Protein, UA Negative Negative     Leuk Esterase, UA Negative Negative    Nitrite, UA Negative Negative    Urobilinogen, UA 0.2 E.U./dL 0.2 - 1.0 E.U./dL   Comprehensive Metabolic Panel   Result Value Ref Range    Glucose 124 (H) 65 - 99 mg/dL    BUN 8 6 - 20 mg/dL    Creatinine 0.76 0.57 - 1.00 mg/dL    Sodium 141 136 - 145 mmol/L    Potassium 3.6 3.5 - 5.2 mmol/L    Chloride 106 98 - 107 mmol/L    CO2 27.0 22.0 - 29.0 mmol/L    Calcium 8.9 8.6 - 10.5 mg/dL    Total Protein 6.9 6.0 - 8.5 g/dL    Albumin 4.20 3.50 - 5.20 g/dL    ALT (SGPT) 12 1 - 33 U/L    AST (SGOT) 19 1 - 32 U/L    Alkaline Phosphatase 119 (H) 39 - 117 U/L    Total Bilirubin 0.2 0.2 - 1.2 mg/dL    eGFR Non African Amer 79 >60 mL/min/1.73    Globulin 2.7 gm/dL    A/G Ratio 1.6 g/dL    BUN/Creatinine Ratio 10.5 7.0 - 25.0    Anion Gap 8.0 5.0 - 15.0 mmol/L   CBC Auto Differential   Result Value Ref Range    WBC 9.04 3.40 - 10.80 10*3/mm3    RBC 4.26 3.77 - 5.28 10*6/mm3    Hemoglobin 11.9 (L) 12.0 - 15.9 g/dL    Hematocrit 36.9 34.0 - 46.6 %    MCV 86.6 79.0 - 97.0 fL    MCH 27.9 26.6 - 33.0 pg    MCHC 32.2 31.5 - 35.7 g/dL    RDW 13.2 12.3 - 15.4 %    RDW-SD 41.1 37.0 - 54.0 fl    MPV 9.3 6.0 - 12.0 fL    Platelets 244 140 - 450 10*3/mm3   Lipase   Result Value Ref Range    Lipase 24 13 - 60 U/L   Manual Differential   Result Value Ref Range    Neutrophil % 46.0 42.7 - 76.0 %    Lymphocyte % 47.0 (H) 19.6 - 45.3 %    Monocyte % 1.0 (L) 5.0 - 12.0 %    Eosinophil % 5.0 0.3 - 6.2 %    Bands %  1.0 0.0 - 5.0 %    Neutrophils Absolute 4.25 1.70 - 7.00 10*3/mm3    Lymphocytes Absolute 4.25 (H) 0.70 - 3.10 10*3/mm3    Monocytes Absolute 0.09 (L) 0.10 - 0.90 10*3/mm3    Eosinophils Absolute 0.45 (H) 0.00 - 0.40 10*3/mm3    RBC Morphology Normal Normal    WBC Morphology Normal Normal    Platelet Estimate Adequate Normal   Urinalysis, Microscopic Only - Urine, Clean Catch   Result Value Ref Range    RBC, UA 3-5 (A) None Seen /HPF    WBC, UA None Seen None Seen, 0-2, 3-5 /HPF    Bacteria, UA  None Seen None Seen /HPF    Squamous Epithelial Cells, UA None Seen None Seen, 0-2 /HPF    Hyaline Casts, UA None Seen None Seen /LPF    Methodology Automated Microscopy    Light Blue Top   Result Value Ref Range    Extra Tube hold for add-on    Green Top (Gel)   Result Value Ref Range    Extra Tube Hold for add-ons.    Lavender Top   Result Value Ref Range    Extra Tube hold for add-on    Gold Top - SST   Result Value Ref Range    Extra Tube Hold for add-ons.                                      MDM  Number of Diagnoses or Management Options  GERD without esophagitis:   Diagnosis management comments: 54-year-old female in the emergency department complaining of epigastric tenderness.  CT images and labs discussed with patient.  Patient was given a GI cocktail in the emergency room and reports that the pain completely resolved.  She was started on PPI therapy by her primary.  Will refer to GI       Amount and/or Complexity of Data Reviewed  Clinical lab tests: reviewed and ordered  Tests in the radiology section of CPT®: ordered and reviewed    Risk of Complications, Morbidity, and/or Mortality  Presenting problems: moderate  Diagnostic procedures: moderate  Management options: moderate    Patient Progress  Patient progress: stable      Final diagnoses:   GERD without esophagitis            Rolf Rivera, APRN  06/20/20 1510

## 2020-06-22 ENCOUNTER — OFFICE VISIT (OUTPATIENT)
Dept: GASTROENTEROLOGY | Facility: CLINIC | Age: 55
End: 2020-06-22

## 2020-06-22 VITALS
WEIGHT: 150 LBS | SYSTOLIC BLOOD PRESSURE: 125 MMHG | DIASTOLIC BLOOD PRESSURE: 66 MMHG | HEART RATE: 68 BPM | BODY MASS INDEX: 29.29 KG/M2

## 2020-06-22 DIAGNOSIS — K21.9 GASTROESOPHAGEAL REFLUX DISEASE, ESOPHAGITIS PRESENCE NOT SPECIFIED: Primary | ICD-10-CM

## 2020-06-22 DIAGNOSIS — R10.13 EPIGASTRIC PAIN: ICD-10-CM

## 2020-06-22 DIAGNOSIS — R19.4 CHANGE IN BOWEL HABITS: ICD-10-CM

## 2020-06-22 DIAGNOSIS — R11.0 NAUSEA: ICD-10-CM

## 2020-06-22 DIAGNOSIS — R19.7 DIARRHEA, UNSPECIFIED TYPE: ICD-10-CM

## 2020-06-22 PROCEDURE — 99214 OFFICE O/P EST MOD 30 MIN: CPT | Performed by: NURSE PRACTITIONER

## 2020-06-22 RX ORDER — SODIUM, POTASSIUM,MAG SULFATES 17.5-3.13G
1 SOLUTION, RECONSTITUTED, ORAL ORAL EVERY 12 HOURS
Qty: 2 BOTTLE | Refills: 0 | Status: SHIPPED | OUTPATIENT
Start: 2020-06-22 | End: 2020-07-09

## 2020-06-22 RX ORDER — DEXTROSE AND SODIUM CHLORIDE 5; .45 G/100ML; G/100ML
30 INJECTION, SOLUTION INTRAVENOUS CONTINUOUS PRN
Status: CANCELLED | OUTPATIENT
Start: 2020-07-01

## 2020-06-22 RX ORDER — SODIUM CHLORIDE 0.9 % (FLUSH) 0.9 %
10 SYRINGE (ML) INJECTION AS NEEDED
Status: CANCELLED | OUTPATIENT
Start: 2020-07-01

## 2020-06-22 RX ORDER — SODIUM CHLORIDE 0.9 % (FLUSH) 0.9 %
3 SYRINGE (ML) INJECTION EVERY 12 HOURS SCHEDULED
Status: CANCELLED | OUTPATIENT
Start: 2020-07-01

## 2020-06-22 NOTE — PROGRESS NOTES
Chief Complaint   Patient presents with   • Heartburn   • Abdominal Pain       Subjective    Lin Pacheco is a 54 y.o. female. she is here today     54-year-old female presents for ER follow-up.  She was evaluate emergency department when epigastric pain became very severe she has been treated for reflux and that has helped symptoms minimally.  States Nexium and carafate have improved symptoms somewhat.  She also reports change in bowel habit recently and frequent episodes of diarrhea.  Heartburn   She complains of abdominal pain and nausea. She reports no belching or no sore throat. Associated symptoms include fatigue. Pertinent negatives include no melena or weight loss.   Abdominal Pain   This is a recurrent problem. The current episode started 1 to 4 weeks ago. The problem occurs constantly. The pain is located in the epigastric region. The pain is severe. The quality of the pain is a sensation of fullness and sharp. The abdominal pain radiates to the LUQ and RUQ. Associated symptoms include diarrhea (2-3 times per day sometimes hard to control), flatus, frequency and nausea. Pertinent negatives include no anorexia, arthralgias, belching, constipation, dysuria, fever, headaches, hematochezia, hematuria, melena, myalgias, vomiting or weight loss. Her past medical history is significant for GERD.     Plan; schedule patient for EGD and colonoscopy due to abdominal pain, reflux, change in bowel habits and diarrhea.       The following portions of the patient's history were reviewed and updated as appropriate:   Past Medical History:   Diagnosis Date   • Anxiety    • Depression      Past Surgical History:   Procedure Laterality Date   •  SECTION     • CHOLECYSTECTOMY       Family History   Problem Relation Age of Onset   • Heart disease Other    • Hypertension Other    • Cancer Other    • Diabetes Mother    • Stomach cancer Mother    • Esophageal cancer Sister      OB History    None       Prior to  Admission medications    Medication Sig Start Date End Date Taking? Authorizing Provider   esomeprazole (nexIUM) 20 MG capsule Take 20 mg by mouth 2 (Two) Times a Day. 3/1/20  Yes Provider, MD Artem   FLUoxetine (PROzac) 20 MG capsule Take 1 capsule by mouth Daily for 30 days. 6/10/20 7/10/20 Yes Sunshine Self MD   sucralfate (Carafate) 1 GM/10ML suspension Take 10 mL by mouth 4 (Four) Times a Day With Meals & at Bedtime. 6/17/20  Yes Sunshine Self MD     No Known Allergies  Social History     Socioeconomic History   • Marital status:      Spouse name: Not on file   • Number of children: Not on file   • Years of education: Not on file   • Highest education level: Not on file   Tobacco Use   • Smoking status: Current Every Day Smoker     Packs/day: 1.00     Years: 5.00     Pack years: 5.00     Types: Cigarettes   • Smokeless tobacco: Never Used   Substance and Sexual Activity   • Alcohol use: No     Frequency: Never       Review of Systems  Review of Systems   Constitutional: Positive for appetite change and fatigue. Negative for activity change, chills, diaphoresis, fever, unexpected weight change and weight loss.   HENT: Negative for sore throat and trouble swallowing.    Respiratory: Negative for shortness of breath.    Gastrointestinal: Positive for abdominal pain, diarrhea (2-3 times per day sometimes hard to control), flatus and nausea. Negative for abdominal distention, anal bleeding, anorexia, blood in stool, constipation, hematochezia, melena, rectal pain and vomiting.   Genitourinary: Positive for frequency. Negative for dysuria and hematuria.   Musculoskeletal: Negative for arthralgias and myalgias.   Skin: Negative for pallor.   Neurological: Negative for light-headedness and headaches.        /66 (BP Location: Left arm)   Pulse 68   Wt 68 kg (150 lb)   BMI 29.29 kg/m²     Objective    Physical Exam   Constitutional: She is oriented to person, place, and time. She appears  well-developed and well-nourished. She is cooperative. No distress.   HENT:   Head: Normocephalic and atraumatic.   Neck: Normal range of motion. Neck supple. No thyromegaly present.   Cardiovascular: Normal rate, regular rhythm and normal heart sounds.   Pulmonary/Chest: Effort normal and breath sounds normal. She has no wheezes. She has no rhonchi. She has no rales.   Abdominal: Soft. Normal appearance and bowel sounds are normal. She exhibits no distension. There is no hepatosplenomegaly. There is tenderness in the right upper quadrant and epigastric area. There is no rigidity and no guarding. No hernia.   Lymphadenopathy:     She has no cervical adenopathy.   Neurological: She is alert and oriented to person, place, and time.   Skin: Skin is warm, dry and intact. No rash noted. No pallor.   Psychiatric: She has a normal mood and affect. Her speech is normal.     Admission on 06/20/2020, Discharged on 06/20/2020   Component Date Value Ref Range Status   • Color, UA 06/20/2020 Yellow  Yellow, Straw, Dark Yellow, Melina Final   • Appearance, UA 06/20/2020 Clear  Clear Final   • pH, UA 06/20/2020 6.5  5.0 - 9.0 Final   • Specific Gravity, UA 06/20/2020 1.006  1.003 - 1.030 Final   • Glucose, UA 06/20/2020 Negative  Negative Final   • Ketones, UA 06/20/2020 Negative  Negative Final   • Bilirubin, UA 06/20/2020 Negative  Negative Final   • Blood, UA 06/20/2020 Small (1+)* Negative Final   • Protein, UA 06/20/2020 Negative  Negative Final   • Leuk Esterase, UA 06/20/2020 Negative  Negative Final   • Nitrite, UA 06/20/2020 Negative  Negative Final   • Urobilinogen, UA 06/20/2020 0.2 E.U./dL  0.2 - 1.0 E.U./dL Final   • Glucose 06/20/2020 124* 65 - 99 mg/dL Final   • BUN 06/20/2020 8  6 - 20 mg/dL Final   • Creatinine 06/20/2020 0.76  0.57 - 1.00 mg/dL Final   • Sodium 06/20/2020 141  136 - 145 mmol/L Final   • Potassium 06/20/2020 3.6  3.5 - 5.2 mmol/L Final   • Chloride 06/20/2020 106  98 - 107 mmol/L Final   • CO2  06/20/2020 27.0  22.0 - 29.0 mmol/L Final   • Calcium 06/20/2020 8.9  8.6 - 10.5 mg/dL Final   • Total Protein 06/20/2020 6.9  6.0 - 8.5 g/dL Final   • Albumin 06/20/2020 4.20  3.50 - 5.20 g/dL Final   • ALT (SGPT) 06/20/2020 12  1 - 33 U/L Final   • AST (SGOT) 06/20/2020 19  1 - 32 U/L Final   • Alkaline Phosphatase 06/20/2020 119* 39 - 117 U/L Final   • Total Bilirubin 06/20/2020 0.2  0.2 - 1.2 mg/dL Final   • eGFR Non African Amer 06/20/2020 79  >60 mL/min/1.73 Final   • Globulin 06/20/2020 2.7  gm/dL Final   • A/G Ratio 06/20/2020 1.6  g/dL Final   • BUN/Creatinine Ratio 06/20/2020 10.5  7.0 - 25.0 Final   • Anion Gap 06/20/2020 8.0  5.0 - 15.0 mmol/L Final   • WBC 06/20/2020 9.04  3.40 - 10.80 10*3/mm3 Final   • RBC 06/20/2020 4.26  3.77 - 5.28 10*6/mm3 Final   • Hemoglobin 06/20/2020 11.9* 12.0 - 15.9 g/dL Final   • Hematocrit 06/20/2020 36.9  34.0 - 46.6 % Final   • MCV 06/20/2020 86.6  79.0 - 97.0 fL Final   • MCH 06/20/2020 27.9  26.6 - 33.0 pg Final   • MCHC 06/20/2020 32.2  31.5 - 35.7 g/dL Final   • RDW 06/20/2020 13.2  12.3 - 15.4 % Final   • RDW-SD 06/20/2020 41.1  37.0 - 54.0 fl Final   • MPV 06/20/2020 9.3  6.0 - 12.0 fL Final   • Platelets 06/20/2020 244  140 - 450 10*3/mm3 Final   • Extra Tube 06/20/2020 hold for add-on   Final    Auto resulted   • Extra Tube 06/20/2020 Hold for add-ons.   Final    Auto resulted.   • Extra Tube 06/20/2020 hold for add-on   Final    Auto resulted   • Extra Tube 06/20/2020 Hold for add-ons.   Final    Auto resulted.   • Lipase 06/20/2020 24  13 - 60 U/L Final   • Neutrophil % 06/20/2020 46.0  42.7 - 76.0 % Final   • Lymphocyte % 06/20/2020 47.0* 19.6 - 45.3 % Final   • Monocyte % 06/20/2020 1.0* 5.0 - 12.0 % Final   • Eosinophil % 06/20/2020 5.0  0.3 - 6.2 % Final   • Bands %  06/20/2020 1.0  0.0 - 5.0 % Final   • Neutrophils Absolute 06/20/2020 4.25  1.70 - 7.00 10*3/mm3 Final   • Lymphocytes Absolute 06/20/2020 4.25* 0.70 - 3.10 10*3/mm3 Final   • Monocytes  Absolute 06/20/2020 0.09* 0.10 - 0.90 10*3/mm3 Final   • Eosinophils Absolute 06/20/2020 0.45* 0.00 - 0.40 10*3/mm3 Final   • RBC Morphology 06/20/2020 Normal  Normal Final   • WBC Morphology 06/20/2020 Normal  Normal Final   • Platelet Estimate 06/20/2020 Adequate  Normal Final   • RBC, UA 06/20/2020 3-5* None Seen /HPF Final   • WBC, UA 06/20/2020 None Seen  None Seen, 0-2, 3-5 /HPF Final   • Bacteria, UA 06/20/2020 None Seen  None Seen /HPF Final   • Squamous Epithelial Cells, UA 06/20/2020 None Seen  None Seen, 0-2 /HPF Final   • Hyaline Casts, UA 06/20/2020 None Seen  None Seen /LPF Final   • Methodology 06/20/2020 Automated Microscopy   Final     Assessment/Plan      1. Gastroesophageal reflux disease, esophagitis presence not specified    2. Epigastric pain    3. Nausea    4. Change in bowel habits    5. Diarrhea, unspecified type    .       Orders placed during this encounter include:  Orders Placed This Encounter   Procedures   • Follow Anesthesia Guidelines / Standing Orders     Standing Status:   Future   • Obtain Informed Consent     Standing Status:   Future     Order Specific Question:   Informed Consent Given For     Answer:   ESOPHAGOGASTRODUODENOSCOPY and Colonoscopy       ESOPHAGOGASTRODUODENOSCOPY (N/A), COLONOSCOPY (N/A)    Review and/or summary of lab tests, radiology, procedures, medications. Review and summary of old records and obtaining of history. The risks and benefits of my recommendations, as well as other treatment options were discussed with the patient today. Questions were answered.    New Medications Ordered This Visit   Medications   • sodium-potassium-magnesium sulfates (Suprep Bowel Prep Kit) 17.5-3.13-1.6 GM/177ML solution oral solution     Sig: Take 1 bottle by mouth Every 12 (Twelve) Hours.     Dispense:  2 bottle     Refill:  0       Follow-up: Return in about 4 weeks (around 7/20/2020) for After test, Recheck.          This document has been electronically signed by Paulina SIMONS  LUCIANA Cooper on June 25, 2020 14:52             Results for orders placed or performed during the hospital encounter of 06/20/20   Gold Top - SST   Result Value Ref Range    Extra Tube Hold for add-ons.    Green Top (Gel)   Result Value Ref Range    Extra Tube Hold for add-ons.    Urinalysis, Microscopic Only - Urine, Clean Catch   Result Value Ref Range    RBC, UA 3-5 (A) None Seen /HPF    WBC, UA None Seen None Seen, 0-2, 3-5 /HPF    Bacteria, UA None Seen None Seen /HPF    Squamous Epithelial Cells, UA None Seen None Seen, 0-2 /HPF    Hyaline Casts, UA None Seen None Seen /LPF    Methodology Automated Microscopy    Urinalysis With Microscopic If Indicated (No Culture) - Urine, Clean Catch   Result Value Ref Range    Color, UA Yellow Yellow, Straw, Dark Yellow, Melina    Appearance, UA Clear Clear    pH, UA 6.5 5.0 - 9.0    Specific Gravity, UA 1.006 1.003 - 1.030    Glucose, UA Negative Negative    Ketones, UA Negative Negative    Bilirubin, UA Negative Negative    Blood, UA Small (1+) (A) Negative    Protein, UA Negative Negative    Leuk Esterase, UA Negative Negative    Nitrite, UA Negative Negative    Urobilinogen, UA 0.2 E.U./dL 0.2 - 1.0 E.U./dL   CBC Auto Differential   Result Value Ref Range    WBC 9.04 3.40 - 10.80 10*3/mm3    RBC 4.26 3.77 - 5.28 10*6/mm3    Hemoglobin 11.9 (L) 12.0 - 15.9 g/dL    Hematocrit 36.9 34.0 - 46.6 %    MCV 86.6 79.0 - 97.0 fL    MCH 27.9 26.6 - 33.0 pg    MCHC 32.2 31.5 - 35.7 g/dL    RDW 13.2 12.3 - 15.4 %    RDW-SD 41.1 37.0 - 54.0 fl    MPV 9.3 6.0 - 12.0 fL    Platelets 244 140 - 450 10*3/mm3   Lavender Top   Result Value Ref Range    Extra Tube hold for add-on    Light Blue Top   Result Value Ref Range    Extra Tube hold for add-on    Manual Differential   Result Value Ref Range    Neutrophil % 46.0 42.7 - 76.0 %    Lymphocyte % 47.0 (H) 19.6 - 45.3 %    Monocyte % 1.0 (L) 5.0 - 12.0 %    Eosinophil % 5.0 0.3 - 6.2 %    Bands %  1.0 0.0 - 5.0 %    Neutrophils Absolute 4.25  1.70 - 7.00 10*3/mm3    Lymphocytes Absolute 4.25 (H) 0.70 - 3.10 10*3/mm3    Monocytes Absolute 0.09 (L) 0.10 - 0.90 10*3/mm3    Eosinophils Absolute 0.45 (H) 0.00 - 0.40 10*3/mm3    RBC Morphology Normal Normal    WBC Morphology Normal Normal    Platelet Estimate Adequate Normal   Lipase   Result Value Ref Range    Lipase 24 13 - 60 U/L   Comprehensive Metabolic Panel   Result Value Ref Range    Glucose 124 (H) 65 - 99 mg/dL    BUN 8 6 - 20 mg/dL    Creatinine 0.76 0.57 - 1.00 mg/dL    Sodium 141 136 - 145 mmol/L    Potassium 3.6 3.5 - 5.2 mmol/L    Chloride 106 98 - 107 mmol/L    CO2 27.0 22.0 - 29.0 mmol/L    Calcium 8.9 8.6 - 10.5 mg/dL    Total Protein 6.9 6.0 - 8.5 g/dL    Albumin 4.20 3.50 - 5.20 g/dL    ALT (SGPT) 12 1 - 33 U/L    AST (SGOT) 19 1 - 32 U/L    Alkaline Phosphatase 119 (H) 39 - 117 U/L    Total Bilirubin 0.2 0.2 - 1.2 mg/dL    eGFR Non African Amer 79 >60 mL/min/1.73    Globulin 2.7 gm/dL    A/G Ratio 1.6 g/dL    BUN/Creatinine Ratio 10.5 7.0 - 25.0    Anion Gap 8.0 5.0 - 15.0 mmol/L   Results for orders placed or performed in visit on 03/11/20   H. Pylori Antigen, Stool - Stool, Per Rectum   Result Value Ref Range    H pylori Ag, Stl Negative Negative   Results for orders placed or performed in visit on 03/10/20   CBC Auto Differential   Result Value Ref Range    WBC 9.97 3.40 - 10.80 10*3/mm3    RBC 4.59 3.77 - 5.28 10*6/mm3    Hemoglobin 13.0 12.0 - 15.9 g/dL    Hematocrit 39.9 34.0 - 46.6 %    MCV 86.9 79.0 - 97.0 fL    MCH 28.3 26.6 - 33.0 pg    MCHC 32.6 31.5 - 35.7 g/dL    RDW 13.4 12.3 - 15.4 %    RDW-SD 42.5 37.0 - 54.0 fl    MPV 9.8 6.0 - 12.0 fL    Platelets 289 140 - 450 10*3/mm3    Neutrophil % 56.2 42.7 - 76.0 %    Lymphocyte % 36.5 19.6 - 45.3 %    Monocyte % 4.5 (L) 5.0 - 12.0 %    Eosinophil % 2.2 0.3 - 6.2 %    Basophil % 0.4 0.0 - 1.5 %    Immature Grans % 0.2 0.0 - 0.5 %    Neutrophils, Absolute 5.60 1.70 - 7.00 10*3/mm3    Lymphocytes, Absolute 3.64 (H) 0.70 - 3.10  10*3/mm3    Monocytes, Absolute 0.45 0.10 - 0.90 10*3/mm3    Eosinophils, Absolute 0.22 0.00 - 0.40 10*3/mm3    Basophils, Absolute 0.04 0.00 - 0.20 10*3/mm3    Immature Grans, Absolute 0.02 0.00 - 0.05 10*3/mm3    nRBC 0.0 0.0 - 0.2 /100 WBC   Hepatitis C antibody   Result Value Ref Range    Hepatitis C Ab Non-Reactive Non-Reactive   Lipase   Result Value Ref Range    Lipase 21 13 - 60 U/L   Lipid Panel   Result Value Ref Range    Total Cholesterol 193 0 - 200 mg/dL    Triglycerides 236 (H) 0 - 150 mg/dL    HDL Cholesterol 43 40 - 60 mg/dL    LDL Cholesterol  103 (H) 0 - 100 mg/dL    VLDL Cholesterol 47.2 mg/dL    LDL/HDL Ratio 2.39    Comprehensive Metabolic Panel   Result Value Ref Range    Glucose 101 (H) 65 - 99 mg/dL    BUN 8 6 - 20 mg/dL    Creatinine 0.85 0.57 - 1.00 mg/dL    Sodium 143 136 - 145 mmol/L    Potassium 4.3 3.5 - 5.2 mmol/L    Chloride 104 98 - 107 mmol/L    CO2 25.0 22.0 - 29.0 mmol/L    Calcium 9.9 8.6 - 10.5 mg/dL    Total Protein 7.7 6.0 - 8.5 g/dL    Albumin 4.40 3.50 - 5.20 g/dL    ALT (SGPT) 16 1 - 33 U/L    AST (SGOT) 22 1 - 32 U/L    Alkaline Phosphatase 129 (H) 39 - 117 U/L    Total Bilirubin 0.2 0.2 - 1.2 mg/dL    eGFR Non African Amer 70 >60 mL/min/1.73    Globulin 3.3 gm/dL    A/G Ratio 1.3 g/dL    BUN/Creatinine Ratio 9.4 7.0 - 25.0    Anion Gap 14.0 5.0 - 15.0 mmol/L     *Note: Due to a large number of results and/or encounters for the requested time period, some results have not been displayed. A complete set of results can be found in Results Review.

## 2020-06-22 NOTE — PATIENT INSTRUCTIONS
Heartburn  Heartburn is a type of pain or discomfort that can happen in the throat or chest. It is often described as a burning pain. It may also cause a bad, acid-like taste in the mouth. Heartburn may feel worse when you lie down or bend over, and it is often worse at night. Heartburn may be caused by stomach contents that move back up into the esophagus (reflux).  Follow these instructions at home:  Eating and drinking    · Avoid certain foods and drinks as told by your health care provider. This may include:  ? Coffee and tea (with or without caffeine).  ? Drinks that contain alcohol.  ? Energy drinks and sports drinks.  ? Carbonated drinks or sodas.  ? Chocolate and cocoa.  ? Peppermint and mint flavorings.  ? Garlic and onions.  ? Horseradish.  ? Spicy and acidic foods, including peppers, chili powder, perry powder, vinegar, hot sauces, and barbecue sauce.  ? Citrus fruit juices and citrus fruits, such as oranges, sundar, and limes.  ? Tomato-based foods, such as red sauce, chili, salsa, and pizza with red sauce.  ? Fried and fatty foods, such as donuts, french fries, potato chips, and high-fat dressings.  ? High-fat meats, such as hot dogs and fatty cuts of red and white meats, such as rib eye steak, sausage, ham, and ibanez.  ? High-fat dairy items, such as whole milk, butter, and cream cheese.  · Eat small, frequent meals instead of large meals.  · Avoid drinking large amounts of liquid with your meals.  · Avoid eating meals during the 2-3 hours before bedtime.  · Avoid lying down right after you eat.  · Do not exercise right after you eat.  Lifestyle         · If you are overweight, reduce your weight to an amount that is healthy for you. Ask your health care provider for guidance about a safe weight loss goal.  · Do not use any products that contain nicotine or tobacco, such as cigarettes, e-cigarettes, and chewing tobacco. These can make your symptoms worse. If you need help quitting, ask your health care  provider.  · Wear loose-fitting clothing. Do not wear anything tight around your waist that causes pressure on your abdomen.  · Raise (elevate) the head of your bed about 6 inches (15 cm) when you sleep.  · Try to reduce your stress, such as with yoga or meditation. If you need help reducing stress, ask your health care provider.  General instructions  · Pay attention to any changes in your symptoms.  · Take over-the-counter and prescription medicines only as told by your health care provider.  ? Do not take aspirin, ibuprofen, or other NSAIDs unless your health care provider told you to do so.  ? Stop medicines only as told by your health care provider. If you stop taking some medicines too quickly, your symptoms may get worse.  · Keep all follow-up visits as told by your health care provider. This is important.  Contact a health care provider if:  · You have new symptoms.  · You have unexplained weight loss.  · You have difficulty swallowing, or it hurts to swallow.  · You have wheezing or a persistent cough.  · Your symptoms do not improve with treatment.  · You have frequent heartburn for more than 2 weeks.  Get help right away if:  · You have pain in your arms, neck, jaw, teeth, or back.  · You feel sweaty, dizzy, or light-headed.  · You have chest pain or shortness of breath.  · You vomit and your vomit looks like blood or coffee grounds.  · Your stool is bloody or black.  These symptoms may represent a serious problem that is an emergency. Do not wait to see if the symptoms will go away. Get medical help right away. Call your local emergency services (911 in the [place] Do not drive yourself to the hospital.  Summary  · Heartburn is a type of pain or discomfort that can happen in the throat or chest. It is often described as a burning pain. It may also cause a bad, acid-like taste in the mouth.  · Avoid certain foods and drinks as told by your health care provider.  · Take over-the-counter and prescription  medicines only as told by your health care provider. Do not take aspirin, ibuprofen, or other NSAIDs unless your health care provider told you to do so.  · Contact a health care provider if your symptoms do not improve or they get worse.  This information is not intended to replace advice given to you by your health care provider. Make sure you discuss any questions you have with your health care provider.  Document Released: 05/06/2010 Document Revised: 05/20/2019 Document Reviewed: 05/20/2019  Elsevier Patient Education © 2020 [employer]

## 2020-06-28 LAB — SARS-COV-2 N GENE RESP QL NAA+PROBE: NOT DETECTED

## 2020-06-28 PROCEDURE — 87635 SARS-COV-2 COVID-19 AMP PRB: CPT | Performed by: INTERNAL MEDICINE

## 2020-06-28 PROCEDURE — C9803 HOPD COVID-19 SPEC COLLECT: HCPCS | Performed by: INTERNAL MEDICINE

## 2020-07-01 ENCOUNTER — ANESTHESIA EVENT (OUTPATIENT)
Dept: GASTROENTEROLOGY | Facility: HOSPITAL | Age: 55
End: 2020-07-01

## 2020-07-01 ENCOUNTER — ANESTHESIA (OUTPATIENT)
Dept: GASTROENTEROLOGY | Facility: HOSPITAL | Age: 55
End: 2020-07-01

## 2020-07-01 ENCOUNTER — HOSPITAL ENCOUNTER (OUTPATIENT)
Facility: HOSPITAL | Age: 55
Setting detail: HOSPITAL OUTPATIENT SURGERY
Discharge: HOME | End: 2020-07-01
Attending: INTERNAL MEDICINE | Admitting: INTERNAL MEDICINE

## 2020-07-01 VITALS
HEIGHT: 60 IN | HEART RATE: 63 BPM | SYSTOLIC BLOOD PRESSURE: 111 MMHG | RESPIRATION RATE: 18 BRPM | TEMPERATURE: 97.3 F | DIASTOLIC BLOOD PRESSURE: 61 MMHG | BODY MASS INDEX: 28.78 KG/M2 | WEIGHT: 146.6 LBS | OXYGEN SATURATION: 98 %

## 2020-07-01 DIAGNOSIS — R10.13 EPIGASTRIC PAIN: ICD-10-CM

## 2020-07-01 DIAGNOSIS — R19.7 DIARRHEA, UNSPECIFIED TYPE: ICD-10-CM

## 2020-07-01 DIAGNOSIS — R19.4 CHANGE IN BOWEL HABITS: ICD-10-CM

## 2020-07-01 DIAGNOSIS — R11.0 NAUSEA: ICD-10-CM

## 2020-07-01 DIAGNOSIS — K21.9 GASTROESOPHAGEAL REFLUX DISEASE, ESOPHAGITIS PRESENCE NOT SPECIFIED: ICD-10-CM

## 2020-07-01 PROCEDURE — 43239 EGD BIOPSY SINGLE/MULTIPLE: CPT | Performed by: INTERNAL MEDICINE

## 2020-07-01 RX ORDER — DEXTROSE AND SODIUM CHLORIDE 5; .45 G/100ML; G/100ML
30 INJECTION, SOLUTION INTRAVENOUS CONTINUOUS PRN
Status: DISCONTINUED | OUTPATIENT
Start: 2020-07-01 | End: 2020-07-01 | Stop reason: HOSPADM

## 2020-07-01 RX ORDER — PROMETHAZINE HYDROCHLORIDE 25 MG/1
25 TABLET ORAL ONCE AS NEEDED
Status: DISCONTINUED | OUTPATIENT
Start: 2020-07-01 | End: 2020-07-01 | Stop reason: HOSPADM

## 2020-07-01 RX ORDER — SODIUM CHLORIDE 0.9 % (FLUSH) 0.9 %
3 SYRINGE (ML) INJECTION EVERY 12 HOURS SCHEDULED
Status: DISCONTINUED | OUTPATIENT
Start: 2020-07-01 | End: 2020-07-01 | Stop reason: HOSPADM

## 2020-07-01 RX ORDER — PROMETHAZINE HYDROCHLORIDE 25 MG/ML
12.5 INJECTION, SOLUTION INTRAMUSCULAR; INTRAVENOUS ONCE AS NEEDED
Status: DISCONTINUED | OUTPATIENT
Start: 2020-07-01 | End: 2020-07-01 | Stop reason: HOSPADM

## 2020-07-01 RX ORDER — MEPERIDINE HYDROCHLORIDE 25 MG/ML
12.5 INJECTION INTRAMUSCULAR; INTRAVENOUS; SUBCUTANEOUS
Status: DISCONTINUED | OUTPATIENT
Start: 2020-07-01 | End: 2020-07-01 | Stop reason: HOSPADM

## 2020-07-01 RX ORDER — ONDANSETRON 2 MG/ML
4 INJECTION INTRAMUSCULAR; INTRAVENOUS ONCE AS NEEDED
Status: DISCONTINUED | OUTPATIENT
Start: 2020-07-01 | End: 2020-07-01 | Stop reason: HOSPADM

## 2020-07-01 RX ORDER — PROMETHAZINE HYDROCHLORIDE 25 MG/1
25 SUPPOSITORY RECTAL ONCE AS NEEDED
Status: DISCONTINUED | OUTPATIENT
Start: 2020-07-01 | End: 2020-07-01 | Stop reason: HOSPADM

## 2020-07-01 RX ORDER — SODIUM CHLORIDE 0.9 % (FLUSH) 0.9 %
10 SYRINGE (ML) INJECTION AS NEEDED
Status: DISCONTINUED | OUTPATIENT
Start: 2020-07-01 | End: 2020-07-01 | Stop reason: HOSPADM

## 2020-07-01 RX ADMIN — DEXTROSE AND SODIUM CHLORIDE 30 ML/HR: 5; 450 INJECTION, SOLUTION INTRAVENOUS at 13:06

## 2020-07-01 NOTE — ANESTHESIA POSTPROCEDURE EVALUATION
Patient: Lin Pacheco    Procedure Summary     Date:  07/01/20 Room / Location:  [facility] ENDOSCOPY 1 / [facility] ENDOSCOPY    Anesthesia Start:  1349 Anesthesia Stop:  1355    Procedure:  ESOPHAGOGASTRODUODENOSCOPY (N/A ) Diagnosis:       Gastroesophageal reflux disease, esophagitis presence not specified      Epigastric pain      Nausea      Change in bowel habits      Diarrhea, unspecified type      (Gastroesophageal reflux disease, esophagitis presence not specified [K21.9])      (Epigastric pain [R10.13])      (Nausea [R11.0])      (Change in bowel habits [R19.4])      (Diarrhea, unspecified type [R19.7])    Surgeon:  Neto Howard MD Provider:  Cameron Joyce CRNA    Anesthesia Type:  MAC ASA Status:  2          Anesthesia Type: MAC    Vitals  No vitals data found for the desired time range.          Post Anesthesia Care and Evaluation    Patient location during evaluation: bedside  Patient participation: complete - patient participated  Level of consciousness: awake and alert  Pain score: 1  Pain management: adequate  Airway patency: patent  Anesthetic complications: No anesthetic complications  PONV Status: none  Cardiovascular status: acceptable  Respiratory status: acceptable  Hydration status: acceptable  Post Neuraxial Block status: Motor and sensory function returned to baseline    
no

## 2020-07-01 NOTE — NURSING NOTE
Pt states she was unable to drink prep for colonoscopy.  aware and informed pt that they will need to get that rescheduled. Will proceed with EGD for today

## 2020-07-01 NOTE — ANESTHESIA PREPROCEDURE EVALUATION
Anesthesia Evaluation     Patient summary reviewed and Nursing notes reviewed   NPO Solid Status: > 8 hours  NPO Liquid Status: > 8 hours           Airway   No difficulty expected  Dental      Pulmonary - negative pulmonary ROS and normal exam   Cardiovascular - negative cardio ROS and normal exam        Neuro/Psych  (+) psychiatric history,     GI/Hepatic/Renal/Endo    (+)  GERD,      Musculoskeletal     Abdominal  - normal exam   Substance History - negative use     OB/GYN negative ob/gyn ROS         Other   arthritis,                      Anesthesia Plan    ASA 2     MAC     intravenous induction     Anesthetic plan, all risks, benefits, and alternatives have been provided, discussed and informed consent has been obtained with: patient.    Plan discussed with CRNA.

## 2020-07-03 LAB
LAB AP CASE REPORT: NORMAL
PATH REPORT.FINAL DX SPEC: NORMAL

## 2020-07-09 ENCOUNTER — OFFICE VISIT (OUTPATIENT)
Dept: GASTROENTEROLOGY | Facility: CLINIC | Age: 55
End: 2020-07-09

## 2020-07-09 VITALS
HEART RATE: 66 BPM | BODY MASS INDEX: 28.9 KG/M2 | SYSTOLIC BLOOD PRESSURE: 132 MMHG | DIASTOLIC BLOOD PRESSURE: 66 MMHG | HEIGHT: 60 IN | WEIGHT: 147.2 LBS

## 2020-07-09 DIAGNOSIS — K29.50 CHRONIC GASTRITIS WITHOUT BLEEDING, UNSPECIFIED GASTRITIS TYPE: ICD-10-CM

## 2020-07-09 DIAGNOSIS — K21.00 GASTROESOPHAGEAL REFLUX DISEASE WITH ESOPHAGITIS: Primary | ICD-10-CM

## 2020-07-09 DIAGNOSIS — K44.9 HIATAL HERNIA: ICD-10-CM

## 2020-07-09 PROCEDURE — 99213 OFFICE O/P EST LOW 20 MIN: CPT | Performed by: NURSE PRACTITIONER

## 2020-07-09 RX ORDER — PANTOPRAZOLE SODIUM 40 MG/1
40 TABLET, DELAYED RELEASE ORAL DAILY
Qty: 30 TABLET | Refills: 5 | OUTPATIENT
Start: 2020-07-09 | End: 2021-10-21

## 2020-07-09 NOTE — PROGRESS NOTES
Chief Complaint   Patient presents with   • Heartburn   • Abdominal Pain   • Nausea       Subjective    Lin Pacheco is a 54 y.o. female. she is here today for follow-up.      54-year-old female presents to discuss EGD and colonoscopy results.  She was not able to do colonoscopy due to nausea vomiting with prep.  States Nexium has not been controlling reflux well still has episodes of severe pain nausea vomiting with intake.  EGD was completed 7/1/2020 noted mildly severe esophagitis, gastritis normal duodenum and a large hiatal hernia.  Antrum biopsy noted reactive gastropathy.  Small bowel biopsy noted no significant histologic abnormality.  Esophageal biopsy noted chronic esophagitis consistent with reflux esophagitis.    Heartburn   She complains of abdominal pain, globus sensation, heartburn, a hoarse voice and nausea. She reports no belching, no chest pain, no choking, no coughing, no dysphagia, no early satiety or no sore throat. This is a chronic problem. The problem has been gradually worsening. The heartburn duration is less than a minute. The heartburn is located in the substernum. The heartburn is of moderate intensity. The heartburn wakes her from sleep. The heartburn limits her activity. The heartburn changes with position. The symptoms are aggravated by certain foods and lying down. Associated symptoms include fatigue. Risk factors include smoking/tobacco exposure, hiatal hernia and caffeine use. She has tried a PPI and a diet change for the symptoms. The treatment provided mild relief. Past procedures include an EGD.   Abdominal Pain   Associated symptoms include nausea and vomiting (with certain intake ). Pertinent negatives include no arthralgias, belching, constipation, diarrhea or fever. Her past medical history is significant for GERD.   Nausea   Associated symptoms include abdominal pain, fatigue, nausea and vomiting (with certain intake ). Pertinent negatives include no arthralgias, chest  pain, chills, coughing, diaphoresis, fever or sore throat.            The following portions of the patient's history were reviewed and updated as appropriate:   Past Medical History:   Diagnosis Date   • Anxiety    • Depression      Past Surgical History:   Procedure Laterality Date   •  SECTION     • CHOLECYSTECTOMY     • ENDOSCOPY N/A 2020    Procedure: ESOPHAGOGASTRODUODENOSCOPY;  Surgeon: Neto Howard MD;  Location: [facility] ENDOSCOPY;  Service: Gastroenterology;  Laterality: N/A;     Family History   Problem Relation Age of Onset   • Heart disease Other    • Hypertension Other    • Cancer Other    • Diabetes Mother    • Stomach cancer Mother    • Esophageal cancer Sister      OB History    None       Prior to Admission medications    Medication Sig Start Date End Date Taking? Authorizing Provider   FLUoxetine (PROzac) 20 MG capsule Take 1 capsule by mouth Daily for 30 days. 6/10/20 7/10/20 Yes Sunshine Self MD   sucralfate (Carafate) 1 GM/10ML suspension Take 10 mL by mouth 4 (Four) Times a Day With Meals & at Bedtime. 20  Yes Sunshine Self MD   esomeprazole (nexIUM) 20 MG capsule Take 20 mg by mouth 2 (Two) Times a Day. 3/1/20 7/9/20 Yes Artem Broussard MD   sodium-potassium-magnesium sulfates (Suprep Bowel Prep Kit) 17.5-3.13-1.6 GM/177ML solution oral solution Take 1 bottle by mouth Every 12 (Twelve) Hours. 20 Yes Paulina Cooper APRN   pantoprazole (PROTONIX) 40 MG EC tablet Take 1 tablet by mouth Daily. 20   Paulina Cooper APRN     No Known Allergies  Social History     Socioeconomic History   • Marital status:      Spouse name: Not on file   • Number of children: Not on file   • Years of education: Not on file   • Highest education level: Not on file   Tobacco Use   • Smoking status: Current Every Day Smoker     Packs/day: 1.00     Years: 5.00     Pack years: 5.00     Types: Cigarettes   • Smokeless tobacco: Never Used   Substance and Sexual  "Activity   • Alcohol use: No     Frequency: Never       Review of Systems  Review of Systems   Constitutional: Positive for appetite change and fatigue. Negative for activity change, chills, diaphoresis, fever and unexpected weight change.   HENT: Positive for hoarse voice. Negative for sore throat and trouble swallowing.    Respiratory: Negative for cough, choking and shortness of breath.    Cardiovascular: Negative for chest pain.   Gastrointestinal: Positive for abdominal pain, heartburn, nausea and vomiting (with certain intake ). Negative for abdominal distention, anal bleeding, blood in stool, constipation, diarrhea, dysphagia and rectal pain.   Musculoskeletal: Negative for arthralgias.   Skin: Negative for pallor.   Neurological: Negative for light-headedness.        /66 (BP Location: Left arm)   Pulse 66   Ht 152.4 cm (60\")   Wt 66.8 kg (147 lb 3.2 oz)   BMI 28.75 kg/m²     Objective    Physical Exam   Constitutional: She is oriented to person, place, and time. She appears well-developed and well-nourished. She is cooperative. No distress.   HENT:   Head: Normocephalic and atraumatic.   Neck: Normal range of motion. Neck supple. No thyromegaly present.   Cardiovascular: Normal rate, regular rhythm and normal heart sounds.   Pulmonary/Chest: Effort normal and breath sounds normal. She has no wheezes. She has no rhonchi. She has no rales.   Abdominal: Soft. Normal appearance and bowel sounds are normal. She exhibits no distension. There is no hepatosplenomegaly. There is tenderness in the epigastric area. There is no rigidity and no guarding. No hernia.   Lymphadenopathy:     She has no cervical adenopathy.   Neurological: She is alert and oriented to person, place, and time.   Skin: Skin is warm, dry and intact. No rash noted. No pallor.   Psychiatric: She has a normal mood and affect. Her speech is normal.     Admission on 07/01/2020, Discharged on 07/01/2020   Component Date Value Ref Range " Status   • COVID19 06/28/2020 Not Detected  Not Detected - Ref. Range Final   • Case Report 07/01/2020    Final                    Value:Surgical Pathology Report                         Case: [id number]                                  Authorizing Provider:  Neto Howard MD        Collected:           07/01/2020 01:53 PM          Ordering Location:     [facility]             Received:            07/01/2020 03:08 PM                                 [facility]                                                     Pathologist:           Jarett Ferrell MD                                                           Specimens:   1) - Small Intestine, Duodenum, small bowel                                                         2) - Gastric, Antrum                                                                                3) - Esophagus, Distal                                                                    • Final Diagnosis 07/01/2020    Final                    Value:This result contains rich text formatting which cannot be displayed here.     Assessment/Plan      1. Gastroesophageal reflux disease with esophagitis    2. Chronic gastritis without bleeding, unspecified gastritis type    3. Hiatal hernia    .   Discontinue Nexium start patient on Protonix recommend smoking cessation avoidance of gastric irritants standard antireflux measures.  Discussed possible referral to general surgeon will first try a change in medication and obtain esophagram prior to referral follow-up in 1 month for recheck return office sooner if needed    Orders placed during this encounter include:  No orders of the defined types were placed in this encounter.      * Surgery not found *    Review and/or summary of lab tests, radiology, procedures, medications. Review and summary of old records and obtaining of history. The risks and benefits of my recommendations, as well as other treatment options were discussed with  the patient today. Questions were answered.    New Medications Ordered This Visit   Medications   • pantoprazole (PROTONIX) 40 MG EC tablet     Sig: Take 1 tablet by mouth Daily.     Dispense:  30 tablet     Refill:  5       Follow-up: Return in about 4 weeks (around 8/6/2020).          This document has been electronically signed by LUCIANA Conrad on July 9, 2020 10:01             Results for orders placed or performed during the hospital encounter of 07/01/20   COVID-19, [facility] IN-HOUSE, NP SWAB IN TRANSPORT MEDIA 8-10 HR TAT - Swab, Nasopharynx   Result Value Ref Range    COVID19 Not Detected Not Detected - Ref. Range   Tissue Pathology Exam   Result Value Ref Range    Case Report       Surgical Pathology Report                         Case: [id number]                                  Authorizing Provider:  Neto Howard MD        Collected:           07/01/2020 01:53 PM          Ordering Location:     [facility]             Received:            07/01/2020 03:08 PM                                 [facility]                                                     Pathologist:           Jarett Ferrell MD                                                           Specimens:   1) - Small Intestine, Duodenum, small bowel                                                         2) - Gastric, Antrum                                                                                3) - Esophagus, Distal                                                                     Final Diagnosis       SEE SCANNED REPORT       Results for orders placed or performed during the hospital encounter of 06/20/20   Gold Top - SST   Result Value Ref Range    Extra Tube Hold for add-ons.    Green Top (Gel)   Result Value Ref Range    Extra Tube Hold for add-ons.    Urinalysis, Microscopic Only - Urine, Clean Catch   Result Value Ref Range    RBC, UA 3-5 (A) None Seen /HPF    WBC, UA None Seen None Seen, 0-2, 3-5 /HPF     Bacteria, UA None Seen None Seen /HPF    Squamous Epithelial Cells, UA None Seen None Seen, 0-2 /HPF    Hyaline Casts, UA None Seen None Seen /LPF    Methodology Automated Microscopy    Urinalysis With Microscopic If Indicated (No Culture) - Urine, Clean Catch   Result Value Ref Range    Color, UA Yellow Yellow, Straw, Dark Yellow, Melina    Appearance, UA Clear Clear    pH, UA 6.5 5.0 - 9.0    Specific Gravity, UA 1.006 1.003 - 1.030    Glucose, UA Negative Negative    Ketones, UA Negative Negative    Bilirubin, UA Negative Negative    Blood, UA Small (1+) (A) Negative    Protein, UA Negative Negative    Leuk Esterase, UA Negative Negative    Nitrite, UA Negative Negative    Urobilinogen, UA 0.2 E.U./dL 0.2 - 1.0 E.U./dL   CBC Auto Differential   Result Value Ref Range    WBC 9.04 3.40 - 10.80 10*3/mm3    RBC 4.26 3.77 - 5.28 10*6/mm3    Hemoglobin 11.9 (L) 12.0 - 15.9 g/dL    Hematocrit 36.9 34.0 - 46.6 %    MCV 86.6 79.0 - 97.0 fL    MCH 27.9 26.6 - 33.0 pg    MCHC 32.2 31.5 - 35.7 g/dL    RDW 13.2 12.3 - 15.4 %    RDW-SD 41.1 37.0 - 54.0 fl    MPV 9.3 6.0 - 12.0 fL    Platelets 244 140 - 450 10*3/mm3   Lavender Top   Result Value Ref Range    Extra Tube hold for add-on    Light Blue Top   Result Value Ref Range    Extra Tube hold for add-on    Manual Differential   Result Value Ref Range    Neutrophil % 46.0 42.7 - 76.0 %    Lymphocyte % 47.0 (H) 19.6 - 45.3 %    Monocyte % 1.0 (L) 5.0 - 12.0 %    Eosinophil % 5.0 0.3 - 6.2 %    Bands %  1.0 0.0 - 5.0 %    Neutrophils Absolute 4.25 1.70 - 7.00 10*3/mm3    Lymphocytes Absolute 4.25 (H) 0.70 - 3.10 10*3/mm3    Monocytes Absolute 0.09 (L) 0.10 - 0.90 10*3/mm3    Eosinophils Absolute 0.45 (H) 0.00 - 0.40 10*3/mm3    RBC Morphology Normal Normal    WBC Morphology Normal Normal    Platelet Estimate Adequate Normal   Lipase   Result Value Ref Range    Lipase 24 13 - 60 U/L   Comprehensive Metabolic Panel   Result Value Ref Range    Glucose 124 (H) 65 - 99 mg/dL    BUN 8  6 - 20 mg/dL    Creatinine 0.76 0.57 - 1.00 mg/dL    Sodium 141 136 - 145 mmol/L    Potassium 3.6 3.5 - 5.2 mmol/L    Chloride 106 98 - 107 mmol/L    CO2 27.0 22.0 - 29.0 mmol/L    Calcium 8.9 8.6 - 10.5 mg/dL    Total Protein 6.9 6.0 - 8.5 g/dL    Albumin 4.20 3.50 - 5.20 g/dL    ALT (SGPT) 12 1 - 33 U/L    AST (SGOT) 19 1 - 32 U/L    Alkaline Phosphatase 119 (H) 39 - 117 U/L    Total Bilirubin 0.2 0.2 - 1.2 mg/dL    eGFR Non African Amer 79 >60 mL/min/1.73    Globulin 2.7 gm/dL    A/G Ratio 1.6 g/dL    BUN/Creatinine Ratio 10.5 7.0 - 25.0    Anion Gap 8.0 5.0 - 15.0 mmol/L   Results for orders placed or performed in visit on 03/11/20   H. Pylori Antigen, Stool - Stool, Per Rectum   Result Value Ref Range    H pylori Ag, Stl Negative Negative   Results for orders placed or performed in visit on 03/10/20   CBC Auto Differential   Result Value Ref Range    WBC 9.97 3.40 - 10.80 10*3/mm3    RBC 4.59 3.77 - 5.28 10*6/mm3    Hemoglobin 13.0 12.0 - 15.9 g/dL    Hematocrit 39.9 34.0 - 46.6 %    MCV 86.9 79.0 - 97.0 fL    MCH 28.3 26.6 - 33.0 pg    MCHC 32.6 31.5 - 35.7 g/dL    RDW 13.4 12.3 - 15.4 %    RDW-SD 42.5 37.0 - 54.0 fl    MPV 9.8 6.0 - 12.0 fL    Platelets 289 140 - 450 10*3/mm3    Neutrophil % 56.2 42.7 - 76.0 %    Lymphocyte % 36.5 19.6 - 45.3 %    Monocyte % 4.5 (L) 5.0 - 12.0 %    Eosinophil % 2.2 0.3 - 6.2 %    Basophil % 0.4 0.0 - 1.5 %    Immature Grans % 0.2 0.0 - 0.5 %    Neutrophils, Absolute 5.60 1.70 - 7.00 10*3/mm3    Lymphocytes, Absolute 3.64 (H) 0.70 - 3.10 10*3/mm3    Monocytes, Absolute 0.45 0.10 - 0.90 10*3/mm3    Eosinophils, Absolute 0.22 0.00 - 0.40 10*3/mm3    Basophils, Absolute 0.04 0.00 - 0.20 10*3/mm3    Immature Grans, Absolute 0.02 0.00 - 0.05 10*3/mm3    nRBC 0.0 0.0 - 0.2 /100 WBC   Hepatitis C antibody   Result Value Ref Range    Hepatitis C Ab Non-Reactive Non-Reactive   Lipase   Result Value Ref Range    Lipase 21 13 - 60 U/L   Lipid Panel   Result Value Ref Range    Total  Cholesterol 193 0 - 200 mg/dL    Triglycerides 236 (H) 0 - 150 mg/dL    HDL Cholesterol 43 40 - 60 mg/dL    LDL Cholesterol  103 (H) 0 - 100 mg/dL    VLDL Cholesterol 47.2 mg/dL    LDL/HDL Ratio 2.39      *Note: Due to a large number of results and/or encounters for the requested time period, some results have not been displayed. A complete set of results can be found in Results Review.

## 2020-07-09 NOTE — PATIENT INSTRUCTIONS
Hiatal Hernia    A hiatal hernia occurs when part of the stomach slides above the muscle that separates the abdomen from the chest (diaphragm). A person can be born with a hiatal hernia (congenital), or it may develop over time. In almost all cases of hiatal hernia, only the top part of the stomach pushes through the diaphragm.  Many people have a hiatal hernia with no symptoms. The larger the hernia, the more likely it is that you will have symptoms. In some cases, a hiatal hernia allows stomach acid to flow back into the tube that carries food from your mouth to your stomach (esophagus). This may cause heartburn symptoms. Severe heartburn symptoms may mean that you have developed a condition called gastroesophageal reflux disease (GERD).  What are the causes?  This condition is caused by a weakness in the opening (hiatus) where the esophagus passes through the diaphragm to attach to the upper part of the stomach. A person may be born with a weakness in the hiatus, or a weakness can develop over time.  What increases the risk?  This condition is more likely to develop in:  · Older people. Age is a major risk factor for a hiatal hernia, especially if you are over the age of 50.  · Pregnant women.  · People who are overweight.  · People who have frequent constipation.  What are the signs or symptoms?  Symptoms of this condition usually develop in the form of GERD symptoms. Symptoms include:  · Heartburn.  · Belching.  · Indigestion.  · Trouble swallowing.  · Coughing or wheezing.  · Sore throat.  · Hoarseness.  · Chest pain.  · Nausea and vomiting.  How is this diagnosed?  This condition may be diagnosed during testing for GERD. Tests that may be done include:  · X-rays of your stomach or chest.  · An upper gastrointestinal (GI) series. This is an X-ray exam of your GI tract that is taken after you swallow a chalky liquid that shows up clearly on the X-ray.  · Endoscopy. This is a procedure to look into your stomach  using a thin, flexible tube that has a tiny camera and light on the end of it.  How is this treated?  This condition may be treated by:  · Dietary and lifestyle changes to help reduce GERD symptoms.  · Medicines. These may include:  ? Over-the-counter antacids.  ? Medicines that make your stomach empty more quickly.  ? Medicines that block the production of stomach acid (H2 blockers).  ? Stronger medicines to reduce stomach acid (proton pump inhibitors).  · Surgery to repair the hernia, if other treatments are not helping.  If you have no symptoms, you may not need treatment.  Follow these instructions at home:  Lifestyle and activity  · Do not use any products that contain nicotine or tobacco, such as cigarettes and e-cigarettes. If you need help quitting, ask your health care provider.  · Try to achieve and maintain a healthy body weight.  · Avoid putting pressure on your abdomen. Anything that puts pressure on your abdomen increases the amount of acid that may be pushed up into your esophagus.  ? Avoid bending over, especially after eating.  ? Raise the head of your bed by putting blocks under the legs. This keeps your head and esophagus higher than your stomach.  ? Do not wear tight clothing around your chest or stomach.  ? Try not to strain when having a bowel movement, when urinating, or when lifting heavy objects.  Eating and drinking  · Avoid foods that can worsen GERD symptoms. These may include:  ? Fatty foods, like fried foods.  ? Citrus fruits, like oranges or lemon.  ? Other foods and drinks that contain acid, like orange juice or tomatoes.  ? Spicy food.  ? Chocolate.  · Eat frequent small meals instead of three large meals a day. This helps prevent your stomach from getting too full.  ? Eat slowly.  ? Do not lie down right after eating.  ? Do not eat 1-2 hours before bed.  · Do not drink beverages with caffeine. These include cola, coffee, cocoa, and tea.  · Do not drink alcohol.  General  instructions  · Take over-the-counter and prescription medicines only as told by your health care provider.  · Keep all follow-up visits as told by your health care provider. This is important.  Contact a health care provider if:  · Your symptoms are not controlled with medicines or lifestyle changes.  · You are having trouble swallowing.  · You have coughing or wheezing that will not go away.  Get help right away if:  · Your pain is getting worse.  · Your pain spreads to your arms, neck, jaw, teeth, or back.  · You have shortness of breath.  · You sweat for no reason.  · You feel sick to your stomach (nauseous) or you vomit.  · You vomit blood.  · You have bright red blood in your stools.  · You have black, tarry stools.  This information is not intended to replace advice given to you by your health care provider. Make sure you discuss any questions you have with your health care provider.  Document Released: 03/09/2005 Document Revised: 11/30/2018 Document Reviewed: 07/23/2018  Elsevier Patient Education © 2020 [employer]

## 2020-07-16 ENCOUNTER — OFFICE VISIT (OUTPATIENT)
Dept: FAMILY MEDICINE CLINIC | Facility: CLINIC | Age: 55
End: 2020-07-16

## 2020-07-16 VITALS
DIASTOLIC BLOOD PRESSURE: 78 MMHG | HEART RATE: 62 BPM | HEIGHT: 60 IN | OXYGEN SATURATION: 99 % | SYSTOLIC BLOOD PRESSURE: 92 MMHG | WEIGHT: 145.7 LBS | BODY MASS INDEX: 28.61 KG/M2

## 2020-07-16 DIAGNOSIS — R10.13 EPIGASTRIC PAIN: ICD-10-CM

## 2020-07-16 DIAGNOSIS — F33.1 MODERATE EPISODE OF RECURRENT MAJOR DEPRESSIVE DISORDER (HCC): Primary | ICD-10-CM

## 2020-07-16 DIAGNOSIS — K21.9 GASTROESOPHAGEAL REFLUX DISEASE, ESOPHAGITIS PRESENCE NOT SPECIFIED: ICD-10-CM

## 2020-07-16 PROCEDURE — 99213 OFFICE O/P EST LOW 20 MIN: CPT | Performed by: FAMILY MEDICINE

## 2020-07-16 RX ORDER — FLUOXETINE HYDROCHLORIDE 20 MG/1
20 CAPSULE ORAL DAILY
Qty: 30 CAPSULE | Refills: 2 | Status: SHIPPED | OUTPATIENT
Start: 2020-07-16 | End: 2022-05-25

## 2020-07-16 RX ORDER — FLUOXETINE 10 MG/1
20 CAPSULE ORAL DAILY
COMMUNITY
End: 2020-07-16

## 2020-07-16 NOTE — PROGRESS NOTES
Follow Up Office Visit          Lin Pacheco is a 54 y.o. female that presents today for   Chief Complaint   Patient presents with   • Depression       HPI    Depression  Symptoms of depression are improved.  Patient is currently taking Prozac 20 mg daily.  Doing well on this dose without any adverse effects.  Notes that she has returned to work without any difficulties.  Patient has had recurrent episodes of depression in the past, and is always normal with Prozac.  She notes that her concentration and moods are doing well.    Stomach pain  Patient continues to have pain in her stomach.  She has been following with gastroenterology.  Had EGD done which showed esophagitis, gastritis and large hiatal hernia.  She was started on Protonix.  Has noticed improvement in reflux symptoms with this medication.  Still having abdominal pain with eating.  She has not been eating very well due to this.  Trying to eat very small meals.  She is able to drink fluids without any difficulty.  Plan for follow-up with gastroenterology next month.  Possible surgical correction of hiatal hernia has been discussed if no improvement in symptoms.      The following portions of the patient's history were reviewed and updated as appropriate: allergies, current medications, past medical history and problem list.    Review of Systems   Constitutional: Negative for chills and fever.   HENT: Negative for congestion and sinus pain.    Respiratory: Negative for cough and shortness of breath.    Cardiovascular: Negative for chest pain and leg swelling.   Gastrointestinal: Positive for abdominal pain, nausea and vomiting. Negative for diarrhea.   Musculoskeletal: Negative for back pain, gait problem and joint swelling.   Skin: Negative for rash.   Neurological: Negative for weakness and headaches.   Psychiatric/Behavioral: Negative for dysphoric mood and sleep disturbance.           Vitals:    07/16/20 1031   BP: 92/78   Pulse: 62   SpO2: 99%  "  Weight: 66.1 kg (145 lb 11.2 oz)   Height: 152.4 cm (60\")     Body mass index is 28.46 kg/m².    Physical Exam    Assessment/Plan   Lin was seen today for depression.    Diagnoses and all orders for this visit:    Moderate episode of recurrent major depressive disorder (CMS/HCC)  Depression symptoms controlled  Continue Prozac 20 mg daily  We will plan to treat for at least 6 months  At the end this time, can discuss whether to trial discontinuation of this medication or stay on SSRI long-term  Patient agreeable with this plan  -     FLUoxetine (PROzac) 20 MG capsule; Take 1 capsule by mouth Daily.    Epigastric pain  Gastroesophageal reflux disease, esophagitis presence not specified  Following with gastroenterology  Doing well on Protonix 40 mg daily  Still having abdominal pain, which is causing some vomiting and making it difficult for her to eat  Patient's blood pressure is lower today than normal  Encouraged patient to make sure that she was drinking plenty of fluids to prevent dehydration, even if she was unable to eat much food  She voiced understanding  We will follow-up as scheduled next month with specialist    Health maintenance items reviewed.  Patient due for Pap smear mammogram.  She declines both today, but is agreeable to rediscussion next visit.  Patient said that she tried to do colonoscopy, but was unable to tolerate the prep due to lots of vomiting.  Plans to attempt colonoscopy again.      Return in about 3 months (around 10/16/2020) for Recheck Depression.        This document has been electronically signed by Sunshine Self MD on July 16, 2020 10:36      "

## 2020-08-18 ENCOUNTER — OFFICE VISIT (OUTPATIENT)
Dept: GASTROENTEROLOGY | Facility: CLINIC | Age: 55
End: 2020-08-18

## 2020-08-18 VITALS
DIASTOLIC BLOOD PRESSURE: 68 MMHG | HEIGHT: 60 IN | HEART RATE: 68 BPM | WEIGHT: 144.6 LBS | BODY MASS INDEX: 28.39 KG/M2 | SYSTOLIC BLOOD PRESSURE: 115 MMHG

## 2020-08-18 DIAGNOSIS — R10.84 GENERALIZED ABDOMINAL PAIN: ICD-10-CM

## 2020-08-18 DIAGNOSIS — R11.0 NAUSEA: ICD-10-CM

## 2020-08-18 DIAGNOSIS — R19.7 DIARRHEA, UNSPECIFIED TYPE: ICD-10-CM

## 2020-08-18 DIAGNOSIS — K21.00 GASTROESOPHAGEAL REFLUX DISEASE WITH ESOPHAGITIS: Primary | ICD-10-CM

## 2020-08-18 PROCEDURE — 99213 OFFICE O/P EST LOW 20 MIN: CPT | Performed by: NURSE PRACTITIONER

## 2020-08-18 RX ORDER — ONDANSETRON 4 MG/1
4 TABLET, FILM COATED ORAL EVERY 8 HOURS PRN
Qty: 25 TABLET | Refills: 0 | COMMUNITY
End: 2020-08-18 | Stop reason: SDUPTHER

## 2020-08-18 RX ORDER — ONDANSETRON 4 MG/1
4 TABLET, FILM COATED ORAL EVERY 8 HOURS PRN
Qty: 25 TABLET | Refills: 5 | Status: SHIPPED | OUTPATIENT
Start: 2020-08-18 | End: 2022-05-25

## 2020-08-18 NOTE — PATIENT INSTRUCTIONS
Nausea, Adult  Nausea is the feeling that you have an upset stomach or that you are about to vomit. Nausea on its own is not usually a serious concern, but it may be an early sign of a more serious medical problem. As nausea gets worse, it can lead to vomiting. If vomiting develops, or if you are not able to drink enough fluids, you are at risk of becoming dehydrated. Dehydration can make you tired and thirsty, cause you to have a dry mouth, and decrease how often you urinate. Older adults and people with other diseases or a weak disease-fighting system (immune system) are at higher risk for dehydration. The main goals of treating your nausea are:  · To relieve your nausea.  · To limit repeated nausea episodes.  · To prevent vomiting and dehydration.  Follow these instructions at home:  Watch your symptoms for any changes. Tell your health care provider about them. Follow these instructions as told by your health care provider.  Eating and drinking         · Take an oral rehydration solution (ORS). This is a drink that is sold at pharmacies and retail stores.  · Drink clear fluids slowly and in small amounts as you are able. Clear fluids include water, ice chips, low-calorie sports drinks, and fruit juice that has water added (diluted fruit juice).  · Eat bland, easy-to-digest foods in small amounts as you are able. These foods include bananas, applesauce, rice, lean meats, toast, and crackers.  · Avoid drinking fluids that contain a lot of sugar or caffeine, such as energy drinks, sports drinks, and soda.  · Avoid alcohol.  · Avoid spicy or fatty foods.  General instructions  · Take over-the-counter and prescription medicines only as told by your health care provider.  · Rest at home while you recover.  · Drink enough fluid to keep your urine pale yellow.  · Breathe slowly and deeply when you feel nauseous.  · Avoid smelling things that have strong odors.  · Wash your hands often using soap and water. If soap and  water are not available, use hand .  · Make sure that all people in your household wash their hands well and often.  · Keep all follow-up visits as told by your health care provider. This is important.  Contact a health care provider if:  · Your nausea gets worse.  · Your nausea does not go away after two days.  · You vomit.  · You cannot drink fluids without vomiting.  · You have any of the following:  ? New symptoms.  ? A fever.  ? A headache.  ? Muscle cramps.  ? A rash.  ? Pain while urinating.  · You feel light-headed or dizzy.  Get help right away if:  · You have pain in your chest, neck, arm, or jaw.  · You feel extremely weak or you faint.  · You have vomit that is bright red or looks like coffee grounds.  · You have bloody or black stools or stools that look like tar.  · You have a severe headache, a stiff neck, or both.  · You have severe pain, cramping, or bloating in your abdomen.  · You have difficulty breathing or are breathing very quickly.  · Your heart is beating very quickly.  · Your skin feels cold and clammy.  · You feel confused.  · You have signs of dehydration, such as:  ? Dark urine, very little urine, or no urine.  ? Cracked lips.  ? Dry mouth.  ? Sunken eyes.  ? Sleepiness.  ? Weakness.  These symptoms may represent a serious problem that is an emergency. Do not wait to see if the symptoms will go away. Get medical help right away. Call your local emergency services (911 in the [place] Do not drive yourself to the hospital.  Summary  · Nausea is the feeling that you have an upset stomach or that you are about to vomit. Nausea on its own is not usually a serious concern, but it may be an early sign of a more serious medical problem.  · If vomiting develops, or if you are not able to drink enough fluids, you are at risk of becoming dehydrated.  · Follow recommendations for eating and drinking and take over-the-counter and prescription medicines only as told by your health care  provider.  · Contact a health care provider right away if your symptoms worsen or you have new symptoms.  · Keep all follow-up visits as told by your health care provider. This is important.  This information is not intended to replace advice given to you by your health care provider. Make sure you discuss any questions you have with your health care provider.  Document Released: 01/25/2006 Document Revised: 05/28/2019 Document Reviewed: 05/28/2019  Elsevier Patient Education © 2020 [employer]

## 2020-08-18 NOTE — PROGRESS NOTES
Chief Complaint   Patient presents with   • Heartburn   • Nausea   • Abdominal Pain   • Diarrhea       Subjective    Lin Pacheco is a 54 y.o. female. she is here today for follow-up.    History of Present Illness  54-year-old female presents for one-month recheck regarding reflux and nausea.  States Protonix has greatly controlled her symptoms but still has some pain with intake mostly follows a liquid diet and has had diarrhea recently.  Denies any melena or hematochezia.  Weight is stable.  She is not been able to do colonoscopy due to nausea vomiting declines to schedule colonoscopy right now.       The following portions of the patient's history were reviewed and updated as appropriate:   Past Medical History:   Diagnosis Date   • Anxiety    • Depression      Past Surgical History:   Procedure Laterality Date   •  SECTION     • CHOLECYSTECTOMY     • ENDOSCOPY N/A 2020    Procedure: ESOPHAGOGASTRODUODENOSCOPY;  Surgeon: Neto Howard MD;  Location: [facility] ENDOSCOPY;  Service: Gastroenterology;  Laterality: N/A;     Family History   Problem Relation Age of Onset   • Heart disease Other    • Hypertension Other    • Cancer Other    • Diabetes Mother    • Stomach cancer Mother    • Esophageal cancer Sister      OB History    None       Prior to Admission medications    Medication Sig Start Date End Date Taking? Authorizing Provider   FLUoxetine (PROzac) 20 MG capsule Take 1 capsule by mouth Daily. 20  Yes Sunshine Self MD   pantoprazole (PROTONIX) 40 MG EC tablet Take 1 tablet by mouth Daily. 20  Yes Paulina Cooper APRN   sucralfate (Carafate) 1 GM/10ML suspension Take 10 mL by mouth 4 (Four) Times a Day With Meals & at Bedtime. 20  Yes Sunshine Self MD     No Known Allergies  Social History     Socioeconomic History   • Marital status:      Spouse name: Not on file   • Number of children: Not on file   • Years of education: Not on file   • Highest education level:  "Not on file   Tobacco Use   • Smoking status: Current Every Day Smoker     Packs/day: 1.00     Years: 5.00     Pack years: 5.00     Types: Cigarettes   • Smokeless tobacco: Never Used   Substance and Sexual Activity   • Alcohol use: No     Frequency: Never       Review of Systems  Review of Systems   Constitutional: Positive for fatigue. Negative for activity change, appetite change, chills, diaphoresis, fever and unexpected weight change.   HENT: Negative for sore throat and trouble swallowing.    Respiratory: Negative for shortness of breath.    Gastrointestinal: Positive for abdominal distention (bloating after eating), abdominal pain (with intake ), diarrhea and nausea. Negative for anal bleeding, blood in stool, constipation, rectal pain and vomiting.   Musculoskeletal: Negative for arthralgias.   Skin: Negative for pallor.   Neurological: Negative for light-headedness.        /68 (BP Location: Right arm)   Pulse 68   Ht 152.4 cm (60\")   Wt 65.6 kg (144 lb 9.6 oz)   BMI 28.24 kg/m²     Objective    Physical Exam   Constitutional: She is oriented to person, place, and time. She appears well-developed and well-nourished. She is cooperative. No distress.   HENT:   Head: Normocephalic and atraumatic.   Neck: Normal range of motion. Neck supple. No thyromegaly present.   Cardiovascular: Normal rate, regular rhythm and normal heart sounds.   Pulmonary/Chest: Effort normal and breath sounds normal. She has no wheezes. She has no rhonchi. She has no rales.   Abdominal: Soft. Normal appearance and bowel sounds are normal. She exhibits no distension. There is no hepatosplenomegaly. There is generalized tenderness. There is no rigidity and no guarding. No hernia.   Lymphadenopathy:     She has no cervical adenopathy.   Neurological: She is alert and oriented to person, place, and time.   Skin: Skin is warm, dry and intact. No rash noted. No pallor.   Psychiatric: She has a normal mood and affect. Her speech is " normal.     Admission on 07/01/2020, Discharged on 07/01/2020   Component Date Value Ref Range Status   • COVID19 06/28/2020 Not Detected  Not Detected - Ref. Range Final   • Case Report 07/01/2020    Final                    Value:Surgical Pathology Report                         Case: [id number]                                  Authorizing Provider:  Neto Howard MD        Collected:           07/01/2020 01:53 PM          Ordering Location:     [facility]             Received:            07/01/2020 03:08 PM                                 [place] [facility]                                                     Pathologist:           Jarett Ferrell MD                                                           Specimens:   1) - Small Intestine, Duodenum, small bowel                                                         2) - Gastric, Antrum                                                                                3) - Esophagus, Distal                                                                    • Final Diagnosis 07/01/2020    Final                    Value:This result contains rich text formatting which cannot be displayed here.     Assessment/Plan      1. Gastroesophageal reflux disease with esophagitis    2. Nausea    3. Diarrhea, unspecified type    4. Generalized abdominal pain    .   Continue PPI and Carafate we will add Zofran for nausea.  Recommend colonoscopy as patient declines to schedule right now.  Follow-up in 6 months for follow-up sooner if she would be willing to schedule colonoscopy due to abdominal pain and diarrhea.    Orders placed during this encounter include:  No orders of the defined types were placed in this encounter.      * Surgery not found *    Review and/or summary of lab tests, radiology, procedures, medications. Review and summary of old records and obtaining of history. The risks and benefits of my recommendations, as well as other treatment options were  discussed with the patient today. Questions were answered.    New Medications Ordered This Visit   Medications   • ondansetron (Zofran) 4 MG tablet     Sig: Take 1 tablet by mouth Every 8 (Eight) Hours As Needed for Nausea or Vomiting.     Dispense:  25 tablet     Refill:  5       Follow-up: Return in about 6 months (around 2/18/2021).          This document has been electronically signed by LUCIANA Conrad on August 18, 2020 10:31             Results for orders placed or performed during the hospital encounter of 07/01/20   COVID-19, [facility] IN-HOUSE, NP SWAB IN TRANSPORT MEDIA 8-10 HR TAT - Swab, Nasopharynx   Result Value Ref Range    COVID19 Not Detected Not Detected - Ref. Range   Tissue Pathology Exam   Result Value Ref Range    Case Report       Surgical Pathology Report                         Case: [id number]                                  Authorizing Provider:  Neto Howard MD        Collected:           07/01/2020 01:53 PM          Ordering Location:     [facility]             Received:            07/01/2020 03:08 PM                                 [place] [facility]                                                     Pathologist:           Jarett Ferrell MD                                                           Specimens:   1) - Small Intestine, Duodenum, small bowel                                                         2) - Gastric, Antrum                                                                                3) - Esophagus, Distal                                                                     Final Diagnosis       SEE SCANNED REPORT       Results for orders placed or performed during the hospital encounter of 06/20/20   Gold Top - SST   Result Value Ref Range    Extra Tube Hold for add-ons.    Green Top (Gel)   Result Value Ref Range    Extra Tube Hold for add-ons.    Urinalysis, Microscopic Only - Urine, Clean Catch   Result Value Ref Range    RBC, UA 3-5 (A) None  Seen /HPF    WBC, UA None Seen None Seen, 0-2, 3-5 /HPF    Bacteria, UA None Seen None Seen /HPF    Squamous Epithelial Cells, UA None Seen None Seen, 0-2 /HPF    Hyaline Casts, UA None Seen None Seen /LPF    Methodology Automated Microscopy    Urinalysis With Microscopic If Indicated (No Culture) - Urine, Clean Catch   Result Value Ref Range    Color, UA Yellow Yellow, Straw, Dark Yellow, Melina    Appearance, UA Clear Clear    pH, UA 6.5 5.0 - 9.0    Specific Gravity, UA 1.006 1.003 - 1.030    Glucose, UA Negative Negative    Ketones, UA Negative Negative    Bilirubin, UA Negative Negative    Blood, UA Small (1+) (A) Negative    Protein, UA Negative Negative    Leuk Esterase, UA Negative Negative    Nitrite, UA Negative Negative    Urobilinogen, UA 0.2 E.U./dL 0.2 - 1.0 E.U./dL   CBC Auto Differential   Result Value Ref Range    WBC 9.04 3.40 - 10.80 10*3/mm3    RBC 4.26 3.77 - 5.28 10*6/mm3    Hemoglobin 11.9 (L) 12.0 - 15.9 g/dL    Hematocrit 36.9 34.0 - 46.6 %    MCV 86.6 79.0 - 97.0 fL    MCH 27.9 26.6 - 33.0 pg    MCHC 32.2 31.5 - 35.7 g/dL    RDW 13.2 12.3 - 15.4 %    RDW-SD 41.1 37.0 - 54.0 fl    MPV 9.3 6.0 - 12.0 fL    Platelets 244 140 - 450 10*3/mm3   Lavender Top   Result Value Ref Range    Extra Tube hold for add-on    Light Blue Top   Result Value Ref Range    Extra Tube hold for add-on    Manual Differential   Result Value Ref Range    Neutrophil % 46.0 42.7 - 76.0 %    Lymphocyte % 47.0 (H) 19.6 - 45.3 %    Monocyte % 1.0 (L) 5.0 - 12.0 %    Eosinophil % 5.0 0.3 - 6.2 %    Bands %  1.0 0.0 - 5.0 %    Neutrophils Absolute 4.25 1.70 - 7.00 10*3/mm3    Lymphocytes Absolute 4.25 (H) 0.70 - 3.10 10*3/mm3    Monocytes Absolute 0.09 (L) 0.10 - 0.90 10*3/mm3    Eosinophils Absolute 0.45 (H) 0.00 - 0.40 10*3/mm3    RBC Morphology Normal Normal    WBC Morphology Normal Normal    Platelet Estimate Adequate Normal   Lipase   Result Value Ref Range    Lipase 24 13 - 60 U/L   Comprehensive Metabolic Panel    Result Value Ref Range    Glucose 124 (H) 65 - 99 mg/dL    BUN 8 6 - 20 mg/dL    Creatinine 0.76 0.57 - 1.00 mg/dL    Sodium 141 136 - 145 mmol/L    Potassium 3.6 3.5 - 5.2 mmol/L    Chloride 106 98 - 107 mmol/L    CO2 27.0 22.0 - 29.0 mmol/L    Calcium 8.9 8.6 - 10.5 mg/dL    Total Protein 6.9 6.0 - 8.5 g/dL    Albumin 4.20 3.50 - 5.20 g/dL    ALT (SGPT) 12 1 - 33 U/L    AST (SGOT) 19 1 - 32 U/L    Alkaline Phosphatase 119 (H) 39 - 117 U/L    Total Bilirubin 0.2 0.2 - 1.2 mg/dL    eGFR Non African Amer 79 >60 mL/min/1.73    Globulin 2.7 gm/dL    A/G Ratio 1.6 g/dL    BUN/Creatinine Ratio 10.5 7.0 - 25.0    Anion Gap 8.0 5.0 - 15.0 mmol/L   Results for orders placed or performed in visit on 03/11/20   H. Pylori Antigen, Stool - Stool, Per Rectum   Result Value Ref Range    H pylori Ag, Stl Negative Negative   Results for orders placed or performed in visit on 03/10/20   CBC Auto Differential   Result Value Ref Range    WBC 9.97 3.40 - 10.80 10*3/mm3    RBC 4.59 3.77 - 5.28 10*6/mm3    Hemoglobin 13.0 12.0 - 15.9 g/dL    Hematocrit 39.9 34.0 - 46.6 %    MCV 86.9 79.0 - 97.0 fL    MCH 28.3 26.6 - 33.0 pg    MCHC 32.6 31.5 - 35.7 g/dL    RDW 13.4 12.3 - 15.4 %    RDW-SD 42.5 37.0 - 54.0 fl    MPV 9.8 6.0 - 12.0 fL    Platelets 289 140 - 450 10*3/mm3    Neutrophil % 56.2 42.7 - 76.0 %    Lymphocyte % 36.5 19.6 - 45.3 %    Monocyte % 4.5 (L) 5.0 - 12.0 %    Eosinophil % 2.2 0.3 - 6.2 %    Basophil % 0.4 0.0 - 1.5 %    Immature Grans % 0.2 0.0 - 0.5 %    Neutrophils, Absolute 5.60 1.70 - 7.00 10*3/mm3    Lymphocytes, Absolute 3.64 (H) 0.70 - 3.10 10*3/mm3    Monocytes, Absolute 0.45 0.10 - 0.90 10*3/mm3    Eosinophils, Absolute 0.22 0.00 - 0.40 10*3/mm3    Basophils, Absolute 0.04 0.00 - 0.20 10*3/mm3    Immature Grans, Absolute 0.02 0.00 - 0.05 10*3/mm3    nRBC 0.0 0.0 - 0.2 /100 WBC   Hepatitis C antibody   Result Value Ref Range    Hepatitis C Ab Non-Reactive Non-Reactive   Lipase   Result Value Ref Range    Lipase  21 13 - 60 U/L   Lipid Panel   Result Value Ref Range    Total Cholesterol 193 0 - 200 mg/dL    Triglycerides 236 (H) 0 - 150 mg/dL    HDL Cholesterol 43 40 - 60 mg/dL    LDL Cholesterol  103 (H) 0 - 100 mg/dL    VLDL Cholesterol 47.2 mg/dL    LDL/HDL Ratio 2.39      *Note: Due to a large number of results and/or encounters for the requested time period, some results have not been displayed. A complete set of results can be found in Results Review.

## 2021-10-21 ENCOUNTER — HOSPITAL ENCOUNTER (EMERGENCY)
Facility: HOSPITAL | Age: 56
Discharge: HOME | End: 2021-10-21
Attending: EMERGENCY MEDICINE | Admitting: EMERGENCY MEDICINE

## 2021-10-21 ENCOUNTER — APPOINTMENT (OUTPATIENT)
Dept: GENERAL RADIOLOGY | Facility: HOSPITAL | Age: 56
End: 2021-10-21

## 2021-10-21 ENCOUNTER — APPOINTMENT (OUTPATIENT)
Dept: CT IMAGING | Facility: HOSPITAL | Age: 56
End: 2021-10-21

## 2021-10-21 VITALS
RESPIRATION RATE: 20 BRPM | DIASTOLIC BLOOD PRESSURE: 61 MMHG | SYSTOLIC BLOOD PRESSURE: 122 MMHG | BODY MASS INDEX: 29.45 KG/M2 | HEART RATE: 58 BPM | HEIGHT: 60 IN | OXYGEN SATURATION: 97 % | WEIGHT: 150 LBS | TEMPERATURE: 98.1 F

## 2021-10-21 DIAGNOSIS — K29.00 ACUTE GASTRITIS WITHOUT HEMORRHAGE, UNSPECIFIED GASTRITIS TYPE: ICD-10-CM

## 2021-10-21 DIAGNOSIS — R10.13 EPIGASTRIC ABDOMINAL PAIN: Primary | ICD-10-CM

## 2021-10-21 DIAGNOSIS — K44.9 HIATAL HERNIA: ICD-10-CM

## 2021-10-21 LAB
ALBUMIN SERPL-MCNC: 4.3 G/DL (ref 3.5–5.2)
ALBUMIN/GLOB SERPL: 1.5 G/DL
ALP SERPL-CCNC: 114 U/L (ref 39–117)
ALT SERPL W P-5'-P-CCNC: 17 U/L (ref 1–33)
AMYLASE SERPL-CCNC: 47 U/L (ref 28–100)
ANION GAP SERPL CALCULATED.3IONS-SCNC: 8 MMOL/L (ref 5–15)
AST SERPL-CCNC: 18 U/L (ref 1–32)
BACTERIA UR QL AUTO: ABNORMAL /HPF
BASOPHILS # BLD AUTO: 0.05 10*3/MM3 (ref 0–0.2)
BASOPHILS NFR BLD AUTO: 0.4 % (ref 0–1.5)
BILIRUB SERPL-MCNC: 0.2 MG/DL (ref 0–1.2)
BILIRUB UR QL STRIP: NEGATIVE
BUN SERPL-MCNC: 13 MG/DL (ref 6–20)
BUN/CREAT SERPL: 14.4 (ref 7–25)
CALCIUM SPEC-SCNC: 9.3 MG/DL (ref 8.6–10.5)
CHLORIDE SERPL-SCNC: 103 MMOL/L (ref 98–107)
CLARITY UR: CLEAR
CO2 SERPL-SCNC: 27 MMOL/L (ref 22–29)
COLOR UR: YELLOW
CREAT SERPL-MCNC: 0.9 MG/DL (ref 0.57–1)
DEPRECATED RDW RBC AUTO: 41.7 FL (ref 37–54)
EOSINOPHIL # BLD AUTO: 0.18 10*3/MM3 (ref 0–0.4)
EOSINOPHIL NFR BLD AUTO: 1.3 % (ref 0.3–6.2)
ERYTHROCYTE [DISTWIDTH] IN BLOOD BY AUTOMATED COUNT: 13.5 % (ref 12.3–15.4)
GFR SERPL CREATININE-BSD FRML MDRD: 65 ML/MIN/1.73
GLOBULIN UR ELPH-MCNC: 2.9 GM/DL
GLUCOSE SERPL-MCNC: 122 MG/DL (ref 65–99)
GLUCOSE UR STRIP-MCNC: NEGATIVE MG/DL
HCT VFR BLD AUTO: 36.6 % (ref 34–46.6)
HGB BLD-MCNC: 11.8 G/DL (ref 12–15.9)
HGB UR QL STRIP.AUTO: ABNORMAL
HOLD SPECIMEN: NORMAL
HYALINE CASTS UR QL AUTO: ABNORMAL /LPF
IMM GRANULOCYTES # BLD AUTO: 0.05 10*3/MM3 (ref 0–0.05)
IMM GRANULOCYTES NFR BLD AUTO: 0.4 % (ref 0–0.5)
KETONES UR QL STRIP: NEGATIVE
LEUKOCYTE ESTERASE UR QL STRIP.AUTO: NEGATIVE
LIPASE SERPL-CCNC: 23 U/L (ref 13–60)
LYMPHOCYTES # BLD AUTO: 6.7 10*3/MM3 (ref 0.7–3.1)
LYMPHOCYTES NFR BLD AUTO: 49.9 % (ref 19.6–45.3)
MCH RBC QN AUTO: 27.7 PG (ref 26.6–33)
MCHC RBC AUTO-ENTMCNC: 32.2 G/DL (ref 31.5–35.7)
MCV RBC AUTO: 85.9 FL (ref 79–97)
MONOCYTES # BLD AUTO: 0.58 10*3/MM3 (ref 0.1–0.9)
MONOCYTES NFR BLD AUTO: 4.3 % (ref 5–12)
NEUTROPHILS NFR BLD AUTO: 43.7 % (ref 42.7–76)
NEUTROPHILS NFR BLD AUTO: 5.87 10*3/MM3 (ref 1.7–7)
NITRITE UR QL STRIP: NEGATIVE
NRBC BLD AUTO-RTO: 0 /100 WBC (ref 0–0.2)
PH UR STRIP.AUTO: 7 [PH] (ref 5–9)
PLATELET # BLD AUTO: 263 10*3/MM3 (ref 140–450)
PMV BLD AUTO: 9.7 FL (ref 6–12)
POTASSIUM SERPL-SCNC: 3.6 MMOL/L (ref 3.5–5.2)
PROT SERPL-MCNC: 7.2 G/DL (ref 6–8.5)
PROT UR QL STRIP: NEGATIVE
RBC # BLD AUTO: 4.26 10*6/MM3 (ref 3.77–5.28)
RBC # UR: ABNORMAL /HPF
REF LAB TEST METHOD: ABNORMAL
SODIUM SERPL-SCNC: 138 MMOL/L (ref 136–145)
SP GR UR STRIP: 1.01 (ref 1–1.03)
SQUAMOUS #/AREA URNS HPF: ABNORMAL /HPF
TROPONIN T SERPL-MCNC: <0.01 NG/ML (ref 0–0.03)
UROBILINOGEN UR QL STRIP: ABNORMAL
WBC # BLD AUTO: 13.43 10*3/MM3 (ref 3.4–10.8)
WBC UR QL AUTO: ABNORMAL /HPF

## 2021-10-21 PROCEDURE — 74022 RADEX COMPL AQT ABD SERIES: CPT

## 2021-10-21 PROCEDURE — 0 DIATRIZOATE MEGLUMINE & SODIUM PER 1 ML: Performed by: EMERGENCY MEDICINE

## 2021-10-21 PROCEDURE — 82150 ASSAY OF AMYLASE: CPT | Performed by: EMERGENCY MEDICINE

## 2021-10-21 PROCEDURE — 99283 EMERGENCY DEPT VISIT LOW MDM: CPT

## 2021-10-21 PROCEDURE — 81001 URINALYSIS AUTO W/SCOPE: CPT | Performed by: EMERGENCY MEDICINE

## 2021-10-21 PROCEDURE — 25010000002 IOPAMIDOL 61 % SOLUTION: Performed by: EMERGENCY MEDICINE

## 2021-10-21 PROCEDURE — 96361 HYDRATE IV INFUSION ADD-ON: CPT

## 2021-10-21 PROCEDURE — 93010 ELECTROCARDIOGRAM REPORT: CPT | Performed by: INTERNAL MEDICINE

## 2021-10-21 PROCEDURE — 93005 ELECTROCARDIOGRAM TRACING: CPT | Performed by: EMERGENCY MEDICINE

## 2021-10-21 PROCEDURE — 96374 THER/PROPH/DIAG INJ IV PUSH: CPT

## 2021-10-21 PROCEDURE — 83690 ASSAY OF LIPASE: CPT | Performed by: EMERGENCY MEDICINE

## 2021-10-21 PROCEDURE — 85025 COMPLETE CBC W/AUTO DIFF WBC: CPT | Performed by: EMERGENCY MEDICINE

## 2021-10-21 PROCEDURE — 74177 CT ABD & PELVIS W/CONTRAST: CPT

## 2021-10-21 PROCEDURE — 80053 COMPREHEN METABOLIC PANEL: CPT | Performed by: EMERGENCY MEDICINE

## 2021-10-21 PROCEDURE — 84484 ASSAY OF TROPONIN QUANT: CPT | Performed by: EMERGENCY MEDICINE

## 2021-10-21 RX ORDER — PANTOPRAZOLE SODIUM 40 MG/10ML
40 INJECTION, POWDER, LYOPHILIZED, FOR SOLUTION INTRAVENOUS ONCE
Status: COMPLETED | OUTPATIENT
Start: 2021-10-21 | End: 2021-10-21

## 2021-10-21 RX ORDER — SODIUM CHLORIDE 0.9 % (FLUSH) 0.9 %
10 SYRINGE (ML) INJECTION AS NEEDED
Status: DISCONTINUED | OUTPATIENT
Start: 2021-10-21 | End: 2021-10-21 | Stop reason: HOSPADM

## 2021-10-21 RX ORDER — ALUMINA, MAGNESIA, AND SIMETHICONE 2400; 2400; 240 MG/30ML; MG/30ML; MG/30ML
15 SUSPENSION ORAL ONCE
Status: COMPLETED | OUTPATIENT
Start: 2021-10-21 | End: 2021-10-21

## 2021-10-21 RX ORDER — LANSOPRAZOLE 30 MG/1
30 CAPSULE, DELAYED RELEASE ORAL DAILY
Qty: 21 CAPSULE | Refills: 0 | Status: SHIPPED | OUTPATIENT
Start: 2021-10-21 | End: 2021-11-11

## 2021-10-21 RX ORDER — SODIUM CHLORIDE 9 MG/ML
125 INJECTION, SOLUTION INTRAVENOUS CONTINUOUS
Status: DISCONTINUED | OUTPATIENT
Start: 2021-10-21 | End: 2021-10-21 | Stop reason: HOSPADM

## 2021-10-21 RX ORDER — LIDOCAINE HYDROCHLORIDE 20 MG/ML
15 SOLUTION OROPHARYNGEAL ONCE
Status: COMPLETED | OUTPATIENT
Start: 2021-10-21 | End: 2021-10-21

## 2021-10-21 RX ADMIN — SODIUM CHLORIDE 125 ML/HR: 9 INJECTION, SOLUTION INTRAVENOUS at 15:35

## 2021-10-21 RX ADMIN — DIATRIZOATE MEGLUMINE AND DIATRIZOATE SODIUM 30 ML: 660; 100 LIQUID ORAL; RECTAL at 16:13

## 2021-10-21 RX ADMIN — ALUMINUM HYDROXIDE, MAGNESIUM HYDROXIDE, AND DIMETHICONE 15 ML: 400; 400; 40 SUSPENSION ORAL at 15:34

## 2021-10-21 RX ADMIN — LIDOCAINE HYDROCHLORIDE 15 ML: 20 SOLUTION ORAL; TOPICAL at 15:34

## 2021-10-21 RX ADMIN — PANTOPRAZOLE SODIUM 40 MG: 40 INJECTION, POWDER, FOR SOLUTION INTRAVENOUS at 15:34

## 2021-10-21 RX ADMIN — IOPAMIDOL 90 ML: 612 INJECTION, SOLUTION INTRAVENOUS at 18:08

## 2021-10-21 NOTE — ED PROVIDER NOTES
"Subjective   56o female pmh significant mdd/gerd presents ED c/o epigastric abdominal pain characterized as \"sharp/burning\"/radiaiting retrosternal, throat/exac supine position, po intake/temporary relief antacids.  ROS neg fever/chills/soa/vomiting/diarrhea/melena/hematochoezia/hematemesis.      History provided by:  Patient  Abdominal Pain  Pain location:  Epigastric  Pain quality: burning and sharp    Pain radiates to:  Chest  Associated symptoms: chest pain    Associated symptoms: no constipation, no diarrhea, no nausea and no vomiting        Review of Systems   Constitutional: Negative.    HENT: Negative.    Eyes: Negative for redness.   Respiratory: Negative.    Cardiovascular: Positive for chest pain.   Gastrointestinal: Positive for abdominal pain. Negative for blood in stool, constipation, diarrhea, nausea and vomiting.   Genitourinary: Negative.    Musculoskeletal: Negative.    Allergic/Immunologic: Negative for immunocompromised state.   All other systems reviewed and are negative.      Past Medical History:   Diagnosis Date   • Anxiety    • Depression        No Known Allergies    Past Surgical History:   Procedure Laterality Date   •  SECTION     • CHOLECYSTECTOMY     • ENDOSCOPY N/A 2020    Procedure: ESOPHAGOGASTRODUODENOSCOPY;  Surgeon: Neto Howard MD;  Location: [facility] ENDOSCOPY;  Service: Gastroenterology;  Laterality: N/A;       Family History   Problem Relation Age of Onset   • Heart disease Other    • Hypertension Other    • Cancer Other    • Diabetes Mother    • Stomach cancer Mother    • Esophageal cancer Sister        Social History     Socioeconomic History   • Marital status:    Tobacco Use   • Smoking status: Current Every Day Smoker     Packs/day: 1.00     Years: 5.00     Pack years: 5.00     Types: Cigarettes   • Smokeless tobacco: Never Used   Substance and Sexual Activity   • Alcohol use: No           Objective   Physical Exam  Vitals and nursing note reviewed. "   Constitutional:       Appearance: Normal appearance.   HENT:      Head: Normocephalic and atraumatic.      Mouth/Throat:      Mouth: Mucous membranes are moist.   Eyes:      Pupils: Pupils are equal, round, and reactive to light.   Cardiovascular:      Rate and Rhythm: Normal rate and regular rhythm.      Pulses: Normal pulses.      Heart sounds: Normal heart sounds. No murmur heard.  No friction rub. No gallop.    Pulmonary:      Effort: Pulmonary effort is normal. No respiratory distress.      Breath sounds: Normal breath sounds. No wheezing, rhonchi or rales.   Abdominal:      General: Abdomen is flat. Bowel sounds are normal.      Palpations: Abdomen is soft.      Tenderness: There is abdominal tenderness in the epigastric area. There is no right CVA tenderness, left CVA tenderness, guarding or rebound. Negative signs include Pacheco's sign and McBurney's sign.       Musculoskeletal:         General: No swelling or deformity. Normal range of motion.      Cervical back: Normal range of motion and neck supple. No rigidity.      Right lower leg: No edema.      Left lower leg: No edema.   Lymphadenopathy:      Cervical: No cervical adenopathy.   Skin:     General: Skin is warm and dry.   Neurological:      General: No focal deficit present.      Mental Status: She is alert and oriented to person, place, and time.      GCS: GCS eye subscore is 4. GCS verbal subscore is 5. GCS motor subscore is 6.         ECG 12 Lead      Date/Time: 10/21/2021 4:27 PM  Performed by: Adan Hamilton MD  Authorized by: Adan Hamilton MD   Interpreted by physician  Rhythm: sinus rhythm  Rate: normal  BPM: 67  QRS axis: normal  Conduction: conduction normal  ST Segments: ST segments normal  T Waves: T waves normal  Other findings: LVH  Q waves: III and aVF  Clinical impression: abnormal ECG                 ED Course      Labs Reviewed   COMPREHENSIVE METABOLIC PANEL - Abnormal; Notable for the following components:       Result Value     Glucose 122 (*)     All other components within normal limits    Narrative:     GFR Normal >60  Chronic Kidney Disease <60  Kidney Failure <15     URINALYSIS W/ MICROSCOPIC IF INDICATED (NO CULTURE) - Abnormal; Notable for the following components:    Blood, UA Trace (*)     All other components within normal limits   CBC WITH AUTO DIFFERENTIAL - Abnormal; Notable for the following components:    WBC 13.43 (*)     Hemoglobin 11.8 (*)     Lymphocyte % 49.9 (*)     Monocyte % 4.3 (*)     Lymphocytes, Absolute 6.70 (*)     All other components within normal limits   URINALYSIS, MICROSCOPIC ONLY - Abnormal; Notable for the following components:    Bacteria, UA 1+ (*)     Squamous Epithelial Cells, UA 3-5 (*)     All other components within normal limits   LIPASE - Normal   AMYLASE - Normal   TROPONIN (IN-HOUSE) - Normal    Narrative:     Troponin T Reference Range:  <= 0.03 ng/mL-   Negative for AMI  >0.03 ng/mL-     Abnormal for myocardial necrosis.  Clinicians would have to utilize clinical acumen, EKG, Troponin and serial changes to determine if it is an Acute Myocardial Infarction or myocardial injury due to an underlying chronic condition.       Results may be falsely decreased if patient taking Biotin.     CBC AND DIFFERENTIAL    Narrative:     The following orders were created for panel order CBC & Differential.  Procedure                               Abnormality         Status                     ---------                               -----------         ------                     CBC Auto Differential[[id number]]        Abnormal            Final result                 Please view results for these tests on the individual orders.   EXTRA TUBES    Narrative:     The following orders were created for panel order Extra Tubes.  Procedure                               Abnormality         Status                     ---------                               -----------         ------                     Gold [facility] -  SST[[id number]]                                   Final result                 Please view results for these tests on the individual orders.   [facility] - SST     XR Abdomen 2+ VW with Chest 1 VW    Result Date: 10/21/2021  Narrative: Abdomen 2 view, chest CLINICAL INDICATION: Abdominal pain.   COMPARISON: CT abdomen June 20, 2020.   FINDINGS: Upright PA chest, Supine and upright views of the abdomen (4 views,    images). Nonobstructive bowel gas pattern. No evidence of free air. Surgical clips right upper quadrant from prior cholecystectomy. The bones are unremarkable. Heart normal size. Lung fields clear.     Impression: Unremarkable abdomen.   Electronically signed by:  Justyn Valenzuela MD  10/21/2021 3:55 PM CDT Workstation: [id number]    CT Abdomen Pelvis With Contrast    Result Date: 10/21/2021  Narrative: CT abdomen and pelvis with contrast TECHNIQUE: Axial images were taken through the abdomen and pelvis after the administration of IV and oral contrast.All CT scans at this facility use dose modulation, iterative reconstruction, and/or weight based dosing when appropriate to reduce radiation dose to as low as reasonably achievable HISTORY: abd pain COMPARISON: June 20, 2020 FINDINGS: Lung bases: The lung bases appear unremarkable. ABDOMEN: The liver appears unremarkable. There is no evidence for mass or intrahepatic biliary ductal dilatation. Postcholecystectomy. The pancreas is heterogeneous appearance with subtle fatty changes, nonspecific and not significantly changed from prior study.. The adrenal glands, pancreas and spleen are normal. The kidneys appear unremarkable. There is no evidence for hydronephrosis or stone. Small hiatal hernia. The large and small bowel of the abdomen and pelvis appears unremarkable. There is no evidence for acute appendicitis. Pelvis: The aorta is normal in caliber. There is no evidence for pathologically enlarged adenopathy. The bladder appears unremarkable. There is no free air or  free fluid. Mild multilevel degenerative changes and degenerative disc disease in the thoracic and lumbar spine. No acute osseous injury. Soft tissue demonstrate small fat-containing umbilical hernia, unchanged from prior study.     Impression: Impression: 1.  No evidence of acute intra-abdominal or pelvic pathology. 2.  Small hiatal hernia. 3.  Heterogeneous appearance of the pancreas, unchanged from prior study, nonspecific. 4.  Small fat-containing umbilical hernia, unchanged from prior study. Electronically signed by:  Cyril Sapp MD, NICK  10/21/2021 6:37 PM CDT Workstation: [id number]                                         [facility]    Final diagnoses:   Epigastric abdominal pain   Hiatal hernia   Acute gastritis without hemorrhage, unspecified gastritis type       ED Disposition  ED Disposition     ED Disposition Condition Comment    Discharge Good           Ofelia Patel, APRN  [address] [facility] [address] [phone]    In 1 day      Kierra Singer MD  [address] [place] [address] [address]  [facility] [id number] [phone]    In 1 day           Medication List      New Prescriptions    lansoprazole 30 MG capsule  Commonly known as: PREVACID  Take 1 capsule by mouth Daily for 21 days.        Stop    pantoprazole 40 MG EC tablet  Commonly known as: PROTONIX           Where to Get Your Medications      These medications were sent to [id number] - [place], [place] - [address] AT [facility] - [phone]  - [phone]   [address] [address]    Phone: [phone]   · lansoprazole 30 MG capsule          Adan Hamilton MD  10/21/21 [id number]

## 2021-10-21 NOTE — ED NOTES
Informed patient of need to begin drinking oral contrast now.       Olesya Aranda, RN  10/21/21 [id number]

## 2021-10-22 LAB
QT INTERVAL: 410 MS
QTC INTERVAL: 433 MS

## 2021-10-22 NOTE — DISCHARGE INSTRUCTIONS
Clear liquid diet x24hrs  Return ED fever, abdominal pain, vomiting, bleeding, dehydration, worse condition, other concerns

## 2022-05-25 ENCOUNTER — OFFICE VISIT (OUTPATIENT)
Dept: GASTROENTEROLOGY | Facility: CLINIC | Age: 57
End: 2022-05-25

## 2022-05-25 VITALS
HEIGHT: 60 IN | HEART RATE: 87 BPM | BODY MASS INDEX: 30.27 KG/M2 | DIASTOLIC BLOOD PRESSURE: 64 MMHG | WEIGHT: 154.2 LBS | SYSTOLIC BLOOD PRESSURE: 119 MMHG

## 2022-05-25 DIAGNOSIS — K21.00 GASTROESOPHAGEAL REFLUX DISEASE WITH ESOPHAGITIS, UNSPECIFIED WHETHER HEMORRHAGE: Primary | ICD-10-CM

## 2022-05-25 DIAGNOSIS — R10.13 EPIGASTRIC PAIN: ICD-10-CM

## 2022-05-25 PROCEDURE — 99213 OFFICE O/P EST LOW 20 MIN: CPT | Performed by: NURSE PRACTITIONER

## 2022-05-25 RX ORDER — DEXTROSE AND SODIUM CHLORIDE 5; .45 G/100ML; G/100ML
30 INJECTION, SOLUTION INTRAVENOUS CONTINUOUS PRN
Status: CANCELLED | OUTPATIENT
Start: 2022-06-21

## 2022-05-25 RX ORDER — DESVENLAFAXINE 100 MG/1
100 TABLET, EXTENDED RELEASE ORAL DAILY
COMMUNITY
Start: 2022-05-20

## 2022-05-25 RX ORDER — OMEPRAZOLE 40 MG/1
40 CAPSULE, DELAYED RELEASE ORAL DAILY
Qty: 30 CAPSULE | Refills: 11 | Status: SHIPPED | OUTPATIENT
Start: 2022-05-25

## 2022-05-25 RX ORDER — PHENTERMINE HYDROCHLORIDE 30 MG/1
CAPSULE ORAL
COMMUNITY
Start: 2022-05-20

## 2022-05-25 NOTE — PROGRESS NOTES
Chief Complaint   Patient presents with   • Nausea   • Vomiting   • Abdominal Pain   • Bloated       Subjective    Lin Pacheco is a 56 y.o. female. she is here today for follow-up.    56-year-old female presents to discuss episode of epigastric pain that took her to ER recently.  She is trying to quit smoking down to 4 cigarettes/day taking phentermine to help curb her appetite.  States pain has been worsening over the last month and became severe thought she was having a heart attack so sought emergency medical attention was given GI cocktail which helped symptoms Protonix was changed to Prevacid which has not improved symptoms she tried Carafate but states has not been able to take it due to taste.    Nausea  Associated symptoms include abdominal pain, anorexia, fatigue, nausea and vomiting. Pertinent negatives include no arthralgias, chills, diaphoresis, fever, headaches or sore throat.   Vomiting   Associated symptoms include abdominal pain. Pertinent negatives include no arthralgias, chills, diarrhea, fever or headaches.   Abdominal Pain  This is a chronic problem. The current episode started more than 1 month ago. The onset quality is gradual. The problem occurs intermittently. The problem has been gradually worsening. The pain is located in the epigastric region. The quality of the pain is sharp, a sensation of fullness and burning (pressure ). Associated symptoms include anorexia, belching, nausea and vomiting. Pertinent negatives include no arthralgias, constipation, diarrhea, fever, flatus, frequency, headaches, hematochezia or hematuria. The pain is aggravated by eating. The pain is relieved by sitting up. Treatments tried: ER changed protonix to prevacid and gave carafate with no help  The treatment provided mild relief.     Plan; schedule patient for EGD due to concern for peptic ulcer will obtain biopsy rule out H. pylori or Gunn's esophagus.  Instructed on bland diet trial of Prilosec since  Protonix and Prevacid have not improved symptoms.       The following portions of the patient's history were reviewed and updated as appropriate:   Past Medical History:   Diagnosis Date   • Anxiety    • Depression      Past Surgical History:   Procedure Laterality Date   •  SECTION     • CHOLECYSTECTOMY     • ENDOSCOPY N/A 2020    Procedure: ESOPHAGOGASTRODUODENOSCOPY;  Surgeon: Neto Howard MD;  Location: [facility] ENDOSCOPY;  Service: Gastroenterology;  Laterality: N/A;     Family History   Problem Relation Age of Onset   • Heart disease Other    • Hypertension Other    • Cancer Other    • Diabetes Mother    • Stomach cancer Mother    • Esophageal cancer Sister      OB History    No obstetric history on file.       Prior to Admission medications    Medication Sig Start Date End Date Taking? Authorizing Provider   desvenlafaxine (PRISTIQ) 100 MG 24 hr tablet Take 100 mg by mouth Daily. 22  Yes ProviderArtem MD   phentermine 30 MG capsule TAKE 1 CAPSULE BY MOUTH EVERY DAY FOR 14 DAYS 22  Yes ProviderArtem MD   FLUoxetine (PROzac) 20 MG capsule Take 1 capsule by mouth Daily. 20  Sunshine Self MD   ondansetron (Zofran) 4 MG tablet Take 1 tablet by mouth Every 8 (Eight) Hours As Needed for Nausea or Vomiting. 20  Paulina Cooper APRN   sucralfate (Carafate) 1 GM/10ML suspension Take 10 mL by mouth 4 (Four) Times a Day With Meals & at Bedtime. 20  Sunshine Self MD     No Known Allergies  Social History     Socioeconomic History   • Marital status:    Tobacco Use   • Smoking status: Current Every Day Smoker     Packs/day: 1.00     Years: 5.00     Pack years: 5.00     Types: Cigarettes   • Smokeless tobacco: Never Used   Substance and Sexual Activity   • Alcohol use: No       Review of Systems  Review of Systems   Constitutional: Positive for fatigue. Negative for activity change, appetite change, chills, diaphoresis, fever and  "unexpected weight change.   HENT: Negative for sore throat and trouble swallowing.    Respiratory: Negative for shortness of breath.    Gastrointestinal: Positive for abdominal pain, anorexia, nausea and vomiting. Negative for abdominal distention, anal bleeding, blood in stool, constipation, diarrhea, flatus, hematochezia and rectal pain.   Genitourinary: Negative for frequency and hematuria.   Musculoskeletal: Negative for arthralgias.   Skin: Negative for pallor.   Neurological: Negative for light-headedness and headaches.        /64 (BP Location: Left arm)   Pulse 87   Ht 152.4 cm (60\")   Wt 69.9 kg (154 lb 3.2 oz)   BMI 30.12 kg/m²     Objective    Physical Exam  Constitutional:       General: She is not in acute distress.     Appearance: Normal appearance. She is normal weight. She is not ill-appearing.   HENT:      Head: Normocephalic and atraumatic.   Pulmonary:      Effort: Pulmonary effort is normal.   Abdominal:      General: Abdomen is flat. Bowel sounds are normal. There is no distension.      Palpations: Abdomen is soft. There is no mass.      Tenderness: There is abdominal tenderness in the epigastric area and left upper quadrant.   Neurological:      Mental Status: She is alert.       Admission on 10/21/2021, Discharged on 10/21/2021   Component Date Value Ref Range Status   • Glucose 10/21/2021 122 (A) 65 - 99 mg/dL Final   • BUN 10/21/2021 13  6 - 20 mg/dL Final   • Creatinine 10/21/2021 0.90  0.57 - 1.00 mg/dL Final   • Sodium 10/21/2021 138  136 - 145 mmol/L Final   • Potassium 10/21/2021 3.6  3.5 - 5.2 mmol/L Final   • Chloride 10/21/2021 103  98 - 107 mmol/L Final   • CO2 10/21/2021 27.0  22.0 - 29.0 mmol/L Final   • Calcium 10/21/2021 9.3  8.6 - 10.5 mg/dL Final   • Total Protein 10/21/2021 7.2  6.0 - 8.5 g/dL Final   • Albumin 10/21/2021 4.30  3.50 - 5.20 g/dL Final   • ALT (SGPT) 10/21/2021 17  1 - 33 U/L Final   • AST (SGOT) 10/21/2021 18  1 - 32 U/L Final   • Alkaline Phosphatase " 10/21/2021 114  39 - 117 U/L Final   • Total Bilirubin 10/21/2021 0.2  0.0 - 1.2 mg/dL Final   • eGFR Non  Amer 10/21/2021 65  >60 mL/min/1.73 Final   • Globulin 10/21/2021 2.9  gm/dL Final   • A/G Ratio 10/21/2021 1.5  g/dL Final   • BUN/Creatinine Ratio 10/21/2021 14.4  7.0 - 25.0 Final   • Anion Gap 10/21/2021 8.0  5.0 - 15.0 mmol/L Final   • Lipase 10/21/2021 23  13 - 60 U/L Final   • Color, UA 10/21/2021 Yellow  Yellow, Straw, Dark Yellow, Melina Final   • Appearance, UA 10/21/2021 Clear  Clear Final   • pH, UA 10/21/2021 7.0  5.0 - 9.0 Final   • Specific Gravity, UA 10/21/2021 1.009  1.003 - 1.030 Final   • Glucose, UA 10/21/2021 Negative  Negative Final   • Ketones, UA 10/21/2021 Negative  Negative Final   • Bilirubin, UA 10/21/2021 Negative  Negative Final   • Blood, UA 10/21/2021 Trace (A) Negative Final   • Protein, UA 10/21/2021 Negative  Negative Final   • Leuk Esterase, UA 10/21/2021 Negative  Negative Final   • Nitrite, UA 10/21/2021 Negative  Negative Final   • Urobilinogen, UA 10/21/2021 0.2 E.U./dL  0.2 - 1.0 E.U./dL Final   • Amylase 10/21/2021 47  28 - 100 U/L Final   • QT Interval 10/21/2021 410  ms Final   • QTC Interval 10/21/2021 433  ms Final   • WBC 10/21/2021 13.43 (A) 3.40 - 10.80 10*3/mm3 Final   • RBC 10/21/2021 4.26  3.77 - 5.28 10*6/mm3 Final   • Hemoglobin 10/21/2021 11.8 (A) 12.0 - 15.9 g/dL Final   • Hematocrit 10/21/2021 36.6  34.0 - 46.6 % Final   • MCV 10/21/2021 85.9  79.0 - 97.0 fL Final   • MCH 10/21/2021 27.7  26.6 - 33.0 pg Final   • MCHC 10/21/2021 32.2  31.5 - 35.7 g/dL Final   • RDW 10/21/2021 13.5  12.3 - 15.4 % Final   • RDW-SD 10/21/2021 41.7  37.0 - 54.0 fl Final   • MPV 10/21/2021 9.7  6.0 - 12.0 fL Final   • Platelets 10/21/2021 263  140 - 450 10*3/mm3 Final   • Neutrophil % 10/21/2021 43.7  42.7 - 76.0 % Final   • Lymphocyte % 10/21/2021 49.9 (A) 19.6 - 45.3 % Final   • Monocyte % 10/21/2021 4.3 (A) 5.0 - 12.0 % Final   • Eosinophil % 10/21/2021 1.3  0.3 -  6.2 % Final   • Basophil % 10/21/2021 0.4  0.0 - 1.5 % Final   • Immature Grans % 10/21/2021 0.4  0.0 - 0.5 % Final   • Neutrophils, Absolute 10/21/2021 5.87  1.70 - 7.00 10*3/mm3 Final   • Lymphocytes, Absolute 10/21/2021 6.70 (A) 0.70 - 3.10 10*3/mm3 Final   • Monocytes, Absolute 10/21/2021 0.58  0.10 - 0.90 10*3/mm3 Final   • Eosinophils, Absolute 10/21/2021 0.18  0.00 - 0.40 10*3/mm3 Final   • Basophils, Absolute 10/21/2021 0.05  0.00 - 0.20 10*3/mm3 Final   • Immature Grans, Absolute 10/21/2021 0.05  0.00 - 0.05 10*3/mm3 Final   • nRBC 10/21/2021 0.0  0.0 - 0.2 /100 WBC Final   • Extra Tube 10/21/2021 Hold for add-ons.   Final    Auto resulted.   • RBC, UA 10/21/2021 None Seen  None Seen /HPF Final   • WBC, UA 10/21/2021 3-5  None Seen, 0-2, 3-5 /HPF Final   • Bacteria, UA 10/21/2021 1+ (A) None Seen /HPF Final   • Squamous Epithelial Cells, UA 10/21/2021 3-5 (A) None Seen, 0-2 /HPF Final   • Hyaline Casts, UA 10/21/2021 None Seen  None Seen /LPF Final   • Methodology 10/21/2021 Manual Light Microscopy   Final   • Troponin T 10/21/2021 <0.010  0.000 - 0.030 ng/mL Final     Assessment & Plan      1. Gastroesophageal reflux disease with esophagitis, unspecified whether hemorrhage    2. Epigastric pain    .       Orders placed during this encounter include:  Orders Placed This Encounter   Procedures   • Follow Anesthesia Guidelines / Standing Orders     Standing Status:   Future   • Obtain Informed Consent     Standing Status:   Future     Order Specific Question:   Informed Consent Given For     Answer:   ESOPHAGOGASTRODUODENOSCOPY possible dilation       ESOPHAGOGASTRODUODENOSCOPY possible dilation (N/A)    Review and/or summary of lab tests, radiology, procedures, medications. Review and summary of old records and obtaining of history. The risks and benefits of my recommendations, as well as other treatment options were discussed with the patient today. Questions were answered.    New Medications Ordered This  Visit   Medications   • omeprazole (priLOSEC) 40 MG capsule     Sig: Take 1 capsule by mouth Daily.     Dispense:  30 capsule     Refill:  11       Follow-up: Return in about 4 weeks (around 6/22/2022) for Recheck, After test.          This document has been electronically signed by LUCIANA Conrad on May 25, 2022 09:35 CDT           I spent 16 minutes caring for Lin on this date of service. This time includes time spent by me in the following activities:preparing for the visit, reviewing tests, obtaining and/or reviewing a separately obtained history, performing a medically appropriate examination and/or evaluation , counseling and educating the patient/family/caregiver, ordering medications, tests, or procedures, referring and communicating with other health care professionals , documenting information in the medical record and care coordination    Results for orders placed or performed during the hospital encounter of 10/21/21   Gold Top - SST   Result Value Ref Range    Extra Tube Hold for add-ons.    Urinalysis, Microscopic Only - Urine, Clean Catch    Specimen: Urine, Clean Catch   Result Value Ref Range    RBC, UA None Seen None Seen /HPF    WBC, UA 3-5 None Seen, 0-2, 3-5 /HPF    Bacteria, UA 1+ (A) None Seen /HPF    Squamous Epithelial Cells, UA 3-5 (A) None Seen, 0-2 /HPF    Hyaline Casts, UA None Seen None Seen /LPF    Methodology Manual Light Microscopy    Urinalysis With Microscopic If Indicated (No Culture) - Urine, Clean Catch    Specimen: Urine, Clean Catch   Result Value Ref Range    Color, UA Yellow Yellow, Straw, Dark Yellow, Melina    Appearance, UA Clear Clear    pH, UA 7.0 5.0 - 9.0    Specific Gravity, UA 1.009 1.003 - 1.030    Glucose, UA Negative Negative    Ketones, UA Negative Negative    Bilirubin, UA Negative Negative    Blood, UA Trace (A) Negative    Protein, UA Negative Negative    Leuk Esterase, UA Negative Negative    Nitrite, UA Negative Negative    Urobilinogen, UA 0.2 E.U./dL  0.2 - 1.0 E.U./dL   CBC Auto Differential    Specimen: Blood   Result Value Ref Range    WBC 13.43 (H) 3.40 - 10.80 10*3/mm3    RBC 4.26 3.77 - 5.28 10*6/mm3    Hemoglobin 11.8 (L) 12.0 - 15.9 g/dL    Hematocrit 36.6 34.0 - 46.6 %    MCV 85.9 79.0 - 97.0 fL    MCH 27.7 26.6 - 33.0 pg    MCHC 32.2 31.5 - 35.7 g/dL    RDW 13.5 12.3 - 15.4 %    RDW-SD 41.7 37.0 - 54.0 fl    MPV 9.7 6.0 - 12.0 fL    Platelets 263 140 - 450 10*3/mm3    Neutrophil % 43.7 42.7 - 76.0 %    Lymphocyte % 49.9 (H) 19.6 - 45.3 %    Monocyte % 4.3 (L) 5.0 - 12.0 %    Eosinophil % 1.3 0.3 - 6.2 %    Basophil % 0.4 0.0 - 1.5 %    Immature Grans % 0.4 0.0 - 0.5 %    Neutrophils, Absolute 5.87 1.70 - 7.00 10*3/mm3    Lymphocytes, Absolute 6.70 (H) 0.70 - 3.10 10*3/mm3    Monocytes, Absolute 0.58 0.10 - 0.90 10*3/mm3    Eosinophils, Absolute 0.18 0.00 - 0.40 10*3/mm3    Basophils, Absolute 0.05 0.00 - 0.20 10*3/mm3    Immature Grans, Absolute 0.05 0.00 - 0.05 10*3/mm3    nRBC 0.0 0.0 - 0.2 /100 WBC   Troponin    Specimen: Blood   Result Value Ref Range    Troponin T <0.010 0.000 - 0.030 ng/mL   Lipase    Specimen: Blood   Result Value Ref Range    Lipase 23 13 - 60 U/L   Amylase    Specimen: Blood   Result Value Ref Range    Amylase 47 28 - 100 U/L   Comprehensive Metabolic Panel    Specimen: Blood   Result Value Ref Range    Glucose 122 (H) 65 - 99 mg/dL    BUN 13 6 - 20 mg/dL    Creatinine 0.90 0.57 - 1.00 mg/dL    Sodium 138 136 - 145 mmol/L    Potassium 3.6 3.5 - 5.2 mmol/L    Chloride 103 98 - 107 mmol/L    CO2 27.0 22.0 - 29.0 mmol/L    Calcium 9.3 8.6 - 10.5 mg/dL    Total Protein 7.2 6.0 - 8.5 g/dL    Albumin 4.30 3.50 - 5.20 g/dL    ALT (SGPT) 17 1 - 33 U/L    AST (SGOT) 18 1 - 32 U/L    Alkaline Phosphatase 114 39 - 117 U/L    Total Bilirubin 0.2 0.0 - 1.2 mg/dL    eGFR Non African Amer 65 >60 mL/min/1.73    Globulin 2.9 gm/dL    A/G Ratio 1.5 g/dL    BUN/Creatinine Ratio 14.4 7.0 - 25.0    Anion Gap 8.0 5.0 - 15.0 mmol/L   ECG 12 Lead    Result Value Ref Range    QT Interval 410 ms    QTC Interval 433 ms   Results for orders placed or performed during the hospital encounter of 07/01/20   COVID-19, [facility] IN-HOUSE, NP SWAB IN TRANSPORT MEDIA 8-10 HR TAT - Swab, Nasopharynx    Specimen: Nasopharynx; Swab   Result Value Ref Range    COVID19 Not Detected Not Detected - Ref. Range   Tissue Pathology Exam    Specimen: A: Small Intestine, Duodenum; Tissue    B: Gastric, Antrum; Tissue    C: Esophagus, Distal; Tissue   Result Value Ref Range    Case Report       Surgical Pathology Report                         Case: [id number]                                  Authorizing Provider:  Neto Howard MD        Collected:           07/01/2020 01:53 PM          Ordering Location:     [facility]             Received:            07/01/2020 03:08 PM                                 [facility]                                                     Pathologist:           Jarett Ferrell MD                                                           Specimens:   1) - Small Intestine, Duodenum, small bowel                                                         2) - Gastric, Antrum                                                                                3) - Esophagus, Distal                                                                     Final Diagnosis       SEE SCANNED REPORT       Results for orders placed or performed during the hospital encounter of 06/20/20   Gold Top - SST   Result Value Ref Range    Extra Tube Hold for add-ons.    Green Top (Gel)   Result Value Ref Range    Extra Tube Hold for add-ons.    Urinalysis, Microscopic Only - Urine, Clean Catch    Specimen: Urine, Clean Catch   Result Value Ref Range    RBC, UA 3-5 (A) None Seen /HPF    WBC, UA None Seen None Seen, 0-2, 3-5 /HPF    Bacteria, UA None Seen None Seen /HPF    Squamous Epithelial Cells, UA None Seen None Seen, 0-2 /HPF    Hyaline Casts, UA None Seen None Seen  /LPF    Methodology Automated Microscopy    Urinalysis With Microscopic If Indicated (No Culture) - Urine, Clean Catch    Specimen: Urine, Clean Catch   Result Value Ref Range    Color, UA Yellow Yellow, Straw, Dark Yellow, Melina    Appearance, UA Clear Clear    pH, UA 6.5 5.0 - 9.0    Specific Gravity, UA 1.006 1.003 - 1.030    Glucose, UA Negative Negative    Ketones, UA Negative Negative    Bilirubin, UA Negative Negative    Blood, UA Small (1+) (A) Negative    Protein, UA Negative Negative    Leuk Esterase, UA Negative Negative    Nitrite, UA Negative Negative    Urobilinogen, UA 0.2 E.U./dL 0.2 - 1.0 E.U./dL   CBC Auto Differential    Specimen: Blood   Result Value Ref Range    WBC 9.04 3.40 - 10.80 10*3/mm3    RBC 4.26 3.77 - 5.28 10*6/mm3    Hemoglobin 11.9 (L) 12.0 - 15.9 g/dL    Hematocrit 36.9 34.0 - 46.6 %    MCV 86.6 79.0 - 97.0 fL    MCH 27.9 26.6 - 33.0 pg    MCHC 32.2 31.5 - 35.7 g/dL    RDW 13.2 12.3 - 15.4 %    RDW-SD 41.1 37.0 - 54.0 fl    MPV 9.3 6.0 - 12.0 fL    Platelets 244 140 - 450 10*3/mm3     *Note: Due to a large number of results and/or encounters for the requested time period, some results have not been displayed. A complete set of results can be found in Results Review.

## 2022-06-21 ENCOUNTER — HOSPITAL ENCOUNTER (OUTPATIENT)
Facility: HOSPITAL | Age: 57
Setting detail: HOSPITAL OUTPATIENT SURGERY
Discharge: HOME | End: 2022-06-21
Attending: INTERNAL MEDICINE | Admitting: INTERNAL MEDICINE

## 2022-06-21 ENCOUNTER — ANESTHESIA (OUTPATIENT)
Dept: GASTROENTEROLOGY | Facility: HOSPITAL | Age: 57
End: 2022-06-21

## 2022-06-21 ENCOUNTER — ANESTHESIA EVENT (OUTPATIENT)
Dept: GASTROENTEROLOGY | Facility: HOSPITAL | Age: 57
End: 2022-06-21

## 2022-06-21 VITALS
TEMPERATURE: 97 F | RESPIRATION RATE: 18 BRPM | SYSTOLIC BLOOD PRESSURE: 130 MMHG | OXYGEN SATURATION: 99 % | HEIGHT: 60 IN | BODY MASS INDEX: 29.99 KG/M2 | DIASTOLIC BLOOD PRESSURE: 55 MMHG | WEIGHT: 152.78 LBS | HEART RATE: 86 BPM

## 2022-06-21 DIAGNOSIS — R10.13 EPIGASTRIC PAIN: ICD-10-CM

## 2022-06-21 DIAGNOSIS — K21.00 GASTROESOPHAGEAL REFLUX DISEASE WITH ESOPHAGITIS, UNSPECIFIED WHETHER HEMORRHAGE: ICD-10-CM

## 2022-06-21 PROCEDURE — 25010000002 PROPOFOL 10 MG/ML EMULSION

## 2022-06-21 PROCEDURE — 43239 EGD BIOPSY SINGLE/MULTIPLE: CPT | Performed by: INTERNAL MEDICINE

## 2022-06-21 RX ORDER — DEXTROSE AND SODIUM CHLORIDE 5; .45 G/100ML; G/100ML
30 INJECTION, SOLUTION INTRAVENOUS CONTINUOUS PRN
Status: DISCONTINUED | OUTPATIENT
Start: 2022-06-21 | End: 2022-06-21 | Stop reason: HOSPADM

## 2022-06-21 RX ORDER — LIDOCAINE HYDROCHLORIDE 20 MG/ML
INJECTION, SOLUTION INTRAVENOUS AS NEEDED
Status: DISCONTINUED | OUTPATIENT
Start: 2022-06-21 | End: 2022-06-21 | Stop reason: SURG

## 2022-06-21 RX ORDER — PROPOFOL 10 MG/ML
VIAL (ML) INTRAVENOUS AS NEEDED
Status: DISCONTINUED | OUTPATIENT
Start: 2022-06-21 | End: 2022-06-21 | Stop reason: SURG

## 2022-06-21 RX ADMIN — DEXTROSE AND SODIUM CHLORIDE 30 ML/HR: 5; 450 INJECTION, SOLUTION INTRAVENOUS at 15:00

## 2022-06-21 RX ADMIN — LIDOCAINE HYDROCHLORIDE 40 MG: 20 INJECTION, SOLUTION INTRAVENOUS at 15:41

## 2022-06-21 RX ADMIN — PROPOFOL 80 MG: 10 INJECTION, EMULSION INTRAVENOUS at 15:41

## 2022-06-21 RX ADMIN — PROPOFOL 30 MG: 10 INJECTION, EMULSION INTRAVENOUS at 15:45

## 2022-06-21 NOTE — ANESTHESIA PREPROCEDURE EVALUATION
Anesthesia Evaluation     Patient summary reviewed and Nursing notes reviewed   NPO Solid Status: > 8 hours  NPO Liquid Status: > 4 hours           Airway   Mallampati: II  TM distance: >3 FB  No difficulty expected  Dental    (+) poor dentition    Pulmonary - negative pulmonary ROS and normal exam   Cardiovascular - negative cardio ROS and normal exam        Neuro/Psych  GI/Hepatic/Renal/Endo    (+)  GERD,      Musculoskeletal     Abdominal  - normal exam   Substance History - negative use     OB/GYN negative ob/gyn ROS         Other   arthritis,                        Anesthesia Plan    ASA 2     MAC     intravenous induction     Anesthetic plan, risks, benefits, and alternatives have been provided, discussed and informed consent has been obtained with: patient.    Plan discussed with CRNA.

## 2022-06-21 NOTE — ANESTHESIA POSTPROCEDURE EVALUATION
Patient: Lin Pacheco    Procedure Summary     Date: 06/21/22 Room / Location: [facility] ENDOSCOPY 1 / [facility] ENDOSCOPY    Anesthesia Start: 1537 Anesthesia Stop: 1549    Procedure: ESOPHAGOGASTRODUODENOSCOPY possible dilation (N/A ) Diagnosis:       Gastroesophageal reflux disease with esophagitis, unspecified whether hemorrhage      Epigastric pain      (Gastroesophageal reflux disease with esophagitis, unspecified whether hemorrhage [K21.00])      (Epigastric pain [R10.13])    Surgeons: Neto Howard MD Provider: Nuno Munguia CRNA    Anesthesia Type: MAC ASA Status: 2          Anesthesia Type: MAC    Vitals  No vitals data found for the desired time range.          Post Anesthesia Care and Evaluation    Patient location during evaluation: bedside  Patient participation: complete - patient participated  Level of consciousness: awake  Pain score: 0  Pain management: adequate    Airway patency: patent  Anesthetic complications: No anesthetic complications  PONV Status: none  Cardiovascular status: acceptable and hemodynamically unstable  Respiratory status: acceptable and spontaneous ventilation  Hydration status: acceptable    Comments: ---------------------------               06/21/22                      1449         ---------------------------   BP:          145/67        Pulse:         85          Resp:          14           Temp:   98.1 °F (36.7 °C)   SpO2:         100%         ---------------------------

## 2022-06-23 LAB — REF LAB TEST METHOD: NORMAL

## 2023-09-26 ENCOUNTER — APPOINTMENT (OUTPATIENT)
Dept: GENERAL RADIOLOGY | Facility: HOSPITAL | Age: 58
End: 2023-09-26
Payer: COMMERCIAL

## 2023-09-26 ENCOUNTER — HOSPITAL ENCOUNTER (EMERGENCY)
Facility: HOSPITAL | Age: 58
Discharge: HOME | End: 2023-09-26
Attending: STUDENT IN AN ORGANIZED HEALTH CARE EDUCATION/TRAINING PROGRAM | Admitting: STUDENT IN AN ORGANIZED HEALTH CARE EDUCATION/TRAINING PROGRAM
Payer: COMMERCIAL

## 2023-09-26 VITALS
BODY MASS INDEX: 27.88 KG/M2 | TEMPERATURE: 99.2 F | SYSTOLIC BLOOD PRESSURE: 108 MMHG | WEIGHT: 142 LBS | DIASTOLIC BLOOD PRESSURE: 68 MMHG | OXYGEN SATURATION: 98 % | HEIGHT: 60 IN | HEART RATE: 62 BPM | RESPIRATION RATE: 16 BRPM

## 2023-09-26 DIAGNOSIS — L03.119 CELLULITIS OF FOOT: Primary | ICD-10-CM

## 2023-09-26 LAB
ALBUMIN SERPL-MCNC: 4.1 G/DL (ref 3.5–5.2)
ALBUMIN/GLOB SERPL: 1.3 G/DL
ALP SERPL-CCNC: 116 U/L (ref 39–117)
ALT SERPL W P-5'-P-CCNC: 14 U/L (ref 1–33)
ANION GAP SERPL CALCULATED.3IONS-SCNC: 10 MMOL/L (ref 5–15)
ANISOCYTOSIS BLD QL: ABNORMAL
AST SERPL-CCNC: 20 U/L (ref 1–32)
BILIRUB SERPL-MCNC: 0.2 MG/DL (ref 0–1.2)
BUN SERPL-MCNC: 12 MG/DL (ref 6–20)
BUN/CREAT SERPL: 14.8 (ref 7–25)
CALCIUM SPEC-SCNC: 9.6 MG/DL (ref 8.6–10.5)
CHLORIDE SERPL-SCNC: 104 MMOL/L (ref 98–107)
CO2 SERPL-SCNC: 29 MMOL/L (ref 22–29)
CREAT SERPL-MCNC: 0.81 MG/DL (ref 0.57–1)
D-LACTATE SERPL-SCNC: 1 MMOL/L (ref 0.5–2)
DEPRECATED RDW RBC AUTO: 45.5 FL (ref 37–54)
EGFRCR SERPLBLD CKD-EPI 2021: 84.3 ML/MIN/1.73
EOSINOPHIL # BLD MANUAL: 0.17 10*3/MM3 (ref 0–0.4)
EOSINOPHIL NFR BLD MANUAL: 1 % (ref 0.3–6.2)
ERYTHROCYTE [DISTWIDTH] IN BLOOD BY AUTOMATED COUNT: 15.1 % (ref 12.3–15.4)
GLOBULIN UR ELPH-MCNC: 3.1 GM/DL
GLUCOSE SERPL-MCNC: 111 MG/DL (ref 65–99)
HCT VFR BLD AUTO: 35.8 % (ref 34–46.6)
HGB BLD-MCNC: 11.1 G/DL (ref 12–15.9)
HOLD SPECIMEN: NORMAL
HYPOCHROMIA BLD QL: ABNORMAL
LYMPHOCYTES # BLD MANUAL: 10.4 10*3/MM3 (ref 0.7–3.1)
LYMPHOCYTES NFR BLD MANUAL: 3 % (ref 5–12)
MCH RBC QN AUTO: 25.6 PG (ref 26.6–33)
MCHC RBC AUTO-ENTMCNC: 31 G/DL (ref 31.5–35.7)
MCV RBC AUTO: 82.5 FL (ref 79–97)
MONOCYTES # BLD: 0.5 10*3/MM3 (ref 0.1–0.9)
NEUTROPHILS # BLD AUTO: 5.45 10*3/MM3 (ref 1.7–7)
NEUTROPHILS NFR BLD MANUAL: 33 % (ref 42.7–76)
PLAT MORPH BLD: NORMAL
PLATELET # BLD AUTO: 260 10*3/MM3 (ref 140–450)
PMV BLD AUTO: 10.2 FL (ref 6–12)
POTASSIUM SERPL-SCNC: 3.6 MMOL/L (ref 3.5–5.2)
PROT SERPL-MCNC: 7.2 G/DL (ref 6–8.5)
RBC # BLD AUTO: 4.34 10*6/MM3 (ref 3.77–5.28)
SODIUM SERPL-SCNC: 143 MMOL/L (ref 136–145)
VARIANT LYMPHS NFR BLD MANUAL: 63 % (ref 19.6–45.3)
WBC MORPH BLD: NORMAL
WBC NRBC COR # BLD: 16.5 10*3/MM3 (ref 3.4–10.8)
WHOLE BLOOD HOLD COAG: NORMAL

## 2023-09-26 PROCEDURE — 99283 EMERGENCY DEPT VISIT LOW MDM: CPT

## 2023-09-26 PROCEDURE — 85025 COMPLETE CBC W/AUTO DIFF WBC: CPT | Performed by: NURSE PRACTITIONER

## 2023-09-26 PROCEDURE — 87040 BLOOD CULTURE FOR BACTERIA: CPT | Performed by: NURSE PRACTITIONER

## 2023-09-26 PROCEDURE — 73630 X-RAY EXAM OF FOOT: CPT

## 2023-09-26 PROCEDURE — 80053 COMPREHEN METABOLIC PANEL: CPT | Performed by: NURSE PRACTITIONER

## 2023-09-26 PROCEDURE — 83605 ASSAY OF LACTIC ACID: CPT | Performed by: NURSE PRACTITIONER

## 2023-09-26 PROCEDURE — 36415 COLL VENOUS BLD VENIPUNCTURE: CPT

## 2023-09-26 PROCEDURE — 96365 THER/PROPH/DIAG IV INF INIT: CPT

## 2023-09-26 PROCEDURE — 85007 BL SMEAR W/DIFF WBC COUNT: CPT | Performed by: NURSE PRACTITIONER

## 2023-09-26 RX ORDER — SODIUM CHLORIDE 0.9 % (FLUSH) 0.9 %
10 SYRINGE (ML) INJECTION AS NEEDED
Status: DISCONTINUED | OUTPATIENT
Start: 2023-09-26 | End: 2023-09-26 | Stop reason: HOSPADM

## 2023-09-26 RX ADMIN — Medication 100 MG: at 20:02

## 2023-09-27 LAB — HOLD SPECIMEN: NORMAL

## 2023-09-27 NOTE — DISCHARGE INSTRUCTIONS
Fill your prescription for doxycycline that was previously given to you by your provider earlier today and take as directed.  Continue with follow-up with wound care as recommended.  Return back to the ER if your symptoms worsen or change in presentation.

## 2023-09-28 NOTE — ED PROVIDER NOTES
"Subjective   History of Present Illness  Patient presents to the ER with c/o redness and swelling to her right foot. She states this started Monday this time. She has a history of recurrent cellulitis to her right foot over the past 5 years. Last occureance was over a year ago. There is a small wound noted between her 4th and 5th toe. She was seen at her PCP today and started on doxycycline which she states she has not picked up yet and a referral to wound management was also placed. Patient states last time they had to give her IV antibiotics. Pain is 5-6/10. She state she does not want anything for pain while here.     Review of Systems   Musculoskeletal:         Right foot swelling and redness     Past Medical History:   Diagnosis Date    Anxiety     Depression        No Known Allergies    Past Surgical History:   Procedure Laterality Date     SECTION      CHOLECYSTECTOMY      ENDOSCOPY N/A 2020    Procedure: ESOPHAGOGASTRODUODENOSCOPY;  Surgeon: Neto Howard MD;  Location: [facility] ENDOSCOPY;  Service: Gastroenterology;  Laterality: N/A;    ENDOSCOPY N/A 2022    Procedure: ESOPHAGOGASTRODUODENOSCOPY possible dilation;  Surgeon: Neto Howard MD;  Location: [facility] ENDOSCOPY;  Service: Gastroenterology;  Laterality: N/A;       Family History   Problem Relation Age of Onset    Heart disease Other     Hypertension Other     Cancer Other     Diabetes Mother     Stomach cancer Mother     Esophageal cancer Sister        Social History     Socioeconomic History    Marital status:    Tobacco Use    Smoking status: Every Day     Packs/day: 1.00     Years: 10.00     Pack years: 10.00     Types: Cigarettes    Smokeless tobacco: Never   Vaping Use    Vaping Use: Never used   Substance and Sexual Activity    Alcohol use: No           Objective   /68 (Patient Position: Lying)   Pulse 62   Temp 99.2 °F (37.3 °C) (Oral)   Resp 16   Ht 152.4 cm (60\")   Wt 64.4 kg (142 lb)   SpO2 98%   " BMI 27.73 kg/m²     Physical Exam  Constitutional:       Appearance: Normal appearance.   Cardiovascular:      Rate and Rhythm: Normal rate.      Pulses: Normal pulses.   Pulmonary:      Effort: Pulmonary effort is normal.   Musculoskeletal:         General: Swelling present. No tenderness.        Feet:    Skin:     Capillary Refill: Capillary refill takes less than 2 seconds.      Findings: Erythema present.   Neurological:      Mental Status: She is alert and oriented to person, place, and time.   Psychiatric:         Mood and Affect: Mood normal.         Behavior: Behavior normal.       Procedures  Results for orders placed or performed during the hospital encounter of 09/26/23   Blood Culture - Blood, Arm, Left    Specimen: Arm, Left; Blood   Result Value Ref Range    Blood Culture No growth at 24 hours    Blood Culture - Blood, Arm, Right    Specimen: Arm, Right; Blood   Result Value Ref Range    Blood Culture No growth at 24 hours    Comprehensive Metabolic Panel    Specimen: Blood   Result Value Ref Range    Glucose 111 (H) 65 - 99 mg/dL    BUN 12 6 - 20 mg/dL    Creatinine 0.81 0.57 - 1.00 mg/dL    Sodium 143 136 - 145 mmol/L    Potassium 3.6 3.5 - 5.2 mmol/L    Chloride 104 98 - 107 mmol/L    CO2 29.0 22.0 - 29.0 mmol/L    Calcium 9.6 8.6 - 10.5 mg/dL    Total Protein 7.2 6.0 - 8.5 g/dL    Albumin 4.1 3.5 - 5.2 g/dL    ALT (SGPT) 14 1 - 33 U/L    AST (SGOT) 20 1 - 32 U/L    Alkaline Phosphatase 116 39 - 117 U/L    Total Bilirubin 0.2 0.0 - 1.2 mg/dL    Globulin 3.1 gm/dL    A/G Ratio 1.3 g/dL    BUN/Creatinine Ratio 14.8 7.0 - 25.0    Anion Gap 10.0 5.0 - 15.0 mmol/L    eGFR 84.3 >60.0 mL/min/1.73   CBC Auto Differential    Specimen: Blood   Result Value Ref Range    WBC 16.50 (H) 3.40 - 10.80 10*3/mm3    RBC 4.34 3.77 - 5.28 10*6/mm3    Hemoglobin 11.1 (L) 12.0 - 15.9 g/dL    Hematocrit 35.8 34.0 - 46.6 %    MCV 82.5 79.0 - 97.0 fL    MCH 25.6 (L) 26.6 - 33.0 pg    MCHC 31.0 (L) 31.5 - 35.7 g/dL    RDW  15.1 12.3 - 15.4 %    RDW-SD 45.5 37.0 - 54.0 fl    MPV 10.2 6.0 - 12.0 fL    Platelets 260 140 - 450 10*3/mm3   Manual Differential    Specimen: Blood   Result Value Ref Range    Neutrophil % 33.0 (L) 42.7 - 76.0 %    Lymphocyte % 63.0 (H) 19.6 - 45.3 %    Monocyte % 3.0 (L) 5.0 - 12.0 %    Eosinophil % 1.0 0.3 - 6.2 %    Neutrophils Absolute 5.45 1.70 - 7.00 10*3/mm3    Lymphocytes Absolute 10.40 (H) 0.70 - 3.10 10*3/mm3    Monocytes Absolute 0.50 0.10 - 0.90 10*3/mm3    Eosinophils Absolute 0.17 0.00 - 0.40 10*3/mm3    Anisocytosis Slight/1+ None Seen    Hypochromia Slight/1+ None Seen    WBC Morphology Normal Normal    Platelet Morphology Normal Normal   Lactic Acid, Plasma    Specimen: Blood   Result Value Ref Range    Lactate 1.0 0.5 - 2.0 mmol/L   Gold Top - SST   Result Value Ref Range    Extra Tube Hold for add-ons.    Gray Top   Result Value Ref Range    Extra Tube Hold for add-ons.    Light Blue Top   Result Value Ref Range    Extra Tube Hold for add-ons.      XR Foot 3+ View Right    Result Date: 9/26/2023  Narrative: XR FOOT RIGHT MIN 3 VIEWS HISTORY: abscess evaluation COMPARISON: None FINDINGS: Frontal, lateral and oblique radiographs of the right foot were provided for review. There is an incidental note of a bipartite inner sesamoid. No acute fracture or dislocation is seen. The soft tissues are normal in appearance. The joint spaces are maintained. Plantar and posterior calcaneal spurs are present.     Impression: 1. No acute bony abnormality in the RIGHT foot.            ED Course                                           Medical Decision Making  Patient presents to the ER with c/o cellulitis to her right foot. WBC is 16 with negative lactic and negative X-ray. Advised to continue with home oral medications and wound therapy as ordered by her PCP today. Patient give one dose of IV antibiotics while in the ER.     Problems Addressed:  Cellulitis of foot: complicated acute illness or  injury    Amount and/or Complexity of Data Reviewed  Labs: ordered.  Radiology: ordered.        Final diagnoses:   Cellulitis of foot       ED Disposition  ED Disposition       ED Disposition   Discharge    Condition   Stable    Comment   --               Ofelia Patel, APRN  [address] [facility] [id number]  [phone]    Schedule an appointment as soon as possible for a visit   For wound re-check         Medication List      No changes were made to your prescriptions during this visit.            Anya Mclean, APRN  09/27/23 1523

## 2023-10-01 LAB
BACTERIA SPEC AEROBE CULT: NORMAL
BACTERIA SPEC AEROBE CULT: NORMAL

## 2024-05-10 NOTE — TELEPHONE ENCOUNTER
Dr. Self,    I have contacted Ms. Pacheco, She said there is a problem with her Insurance not wanting to pay.  She is trying to get it straightened out and will reschedule once it is straightened out.     She was reminded to keep her follow-up appointment on 06/02/2020   134

## (undated) DEVICE — SINGLE-USE BIOPSY FORCEPS: Brand: RADIAL JAW 4

## (undated) DEVICE — BITEBLOCK ENDO W/STRAP 60F A/ LF DISP

## (undated) DEVICE — CANN SMPL SOFTECH BIFLO ETCO2 A/M 7FT